# Patient Record
Sex: MALE | Race: WHITE | NOT HISPANIC OR LATINO | Employment: OTHER | URBAN - METROPOLITAN AREA
[De-identification: names, ages, dates, MRNs, and addresses within clinical notes are randomized per-mention and may not be internally consistent; named-entity substitution may affect disease eponyms.]

---

## 2022-01-26 ENCOUNTER — OFFICE VISIT (OUTPATIENT)
Dept: INTERNAL MEDICINE | Facility: CLINIC | Age: 67
End: 2022-01-26

## 2022-01-26 VITALS
DIASTOLIC BLOOD PRESSURE: 82 MMHG | BODY MASS INDEX: 28.29 KG/M2 | HEART RATE: 111 BPM | WEIGHT: 197.6 LBS | TEMPERATURE: 98.2 F | HEIGHT: 70 IN | OXYGEN SATURATION: 98 % | SYSTOLIC BLOOD PRESSURE: 136 MMHG

## 2022-01-26 DIAGNOSIS — E78.2 MIXED HYPERLIPIDEMIA: Chronic | ICD-10-CM

## 2022-01-26 DIAGNOSIS — E66.3 OVERWEIGHT (BMI 25.0-29.9): ICD-10-CM

## 2022-01-26 DIAGNOSIS — J45.30 MILD PERSISTENT ASTHMA WITHOUT COMPLICATION: Chronic | ICD-10-CM

## 2022-01-26 DIAGNOSIS — R73.09 ELEVATED GLUCOSE: ICD-10-CM

## 2022-01-26 DIAGNOSIS — Z12.5 SCREENING PSA (PROSTATE SPECIFIC ANTIGEN): ICD-10-CM

## 2022-01-26 DIAGNOSIS — C44.219 BCC (BASAL CELL CARCINOMA), EAR, LEFT: ICD-10-CM

## 2022-01-26 DIAGNOSIS — Z11.59 NEED FOR HEPATITIS C SCREENING TEST: ICD-10-CM

## 2022-01-26 DIAGNOSIS — I10 ESSENTIAL HYPERTENSION: Primary | Chronic | ICD-10-CM

## 2022-01-26 PROBLEM — J30.9 ALLERGIC RHINITIS: Status: ACTIVE | Noted: 2017-02-02

## 2022-01-26 PROBLEM — E78.5 HYPERLIPIDEMIA: Status: ACTIVE | Noted: 2017-02-02

## 2022-01-26 PROCEDURE — 99204 OFFICE O/P NEW MOD 45 MIN: CPT | Performed by: STUDENT IN AN ORGANIZED HEALTH CARE EDUCATION/TRAINING PROGRAM

## 2022-01-26 RX ORDER — SIMVASTATIN 20 MG
TABLET ORAL DAILY
COMMUNITY
Start: 2022-01-03 | End: 2022-03-23

## 2022-01-26 RX ORDER — TIZANIDINE 4 MG/1
TABLET ORAL
COMMUNITY
End: 2022-04-14 | Stop reason: SDUPTHER

## 2022-01-26 RX ORDER — MONTELUKAST SODIUM 10 MG/1
TABLET ORAL
COMMUNITY
End: 2022-04-14 | Stop reason: SDUPTHER

## 2022-01-26 RX ORDER — ALBUTEROL SULFATE 90 UG/1
AEROSOL, METERED RESPIRATORY (INHALATION)
COMMUNITY
End: 2022-04-14 | Stop reason: SDUPTHER

## 2022-01-26 RX ORDER — IBUPROFEN 200 MG
TABLET ORAL AS NEEDED
COMMUNITY

## 2022-01-26 RX ORDER — GUAIFENESIN 600 MG/1
TABLET, EXTENDED RELEASE ORAL AS NEEDED
COMMUNITY

## 2022-01-26 RX ORDER — PSEUDOEPHEDRINE HCL 30 MG
30 TABLET ORAL
COMMUNITY
End: 2022-04-14 | Stop reason: SDUPTHER

## 2022-01-26 RX ORDER — MULTIPLE VITAMINS W/ MINERALS TAB 9MG-400MCG
1 TAB ORAL DAILY
COMMUNITY

## 2022-01-26 RX ORDER — FLUTICASONE PROPIONATE 50 MCG
SPRAY, SUSPENSION (ML) NASAL
COMMUNITY
End: 2022-04-14 | Stop reason: SDUPTHER

## 2022-01-26 RX ORDER — FEXOFENADINE HCL 180 MG/1
180 TABLET ORAL DAILY
COMMUNITY

## 2022-01-26 RX ORDER — FAMOTIDINE 20 MG/1
20 TABLET, FILM COATED ORAL DAILY
COMMUNITY

## 2022-01-26 RX ORDER — BUDESONIDE AND FORMOTEROL FUMARATE DIHYDRATE 80; 4.5 UG/1; UG/1
2 AEROSOL RESPIRATORY (INHALATION) 2 TIMES DAILY
COMMUNITY
Start: 2022-01-03 | End: 2022-04-14 | Stop reason: SDUPTHER

## 2022-01-26 RX ORDER — LISINOPRIL 10 MG/1
TABLET ORAL DAILY
COMMUNITY
Start: 2022-01-03 | End: 2022-04-14 | Stop reason: SDUPTHER

## 2022-01-26 NOTE — PROGRESS NOTES
"Chief Complaint  Gabino Gannon is a 66 y.o. male presenting for Annual Exam (establish care).     From [place], [place]. Moved to [place] 1991. 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 Has worked with [employer] from 1991 until retiring 2011. . Has son and daughter who live in [place].     Patient has a past medical history of hypertension, hyperlipidemia, mild persistent asthma, seasonal allergies and basal cell carcinoma left ear (annual f/u derm Dr. Hernandez)    History of Present Illness  Patient is here to establish care after his previous PCP retired.    Patient started on Saturday, 1/15/2022 with runny nose, also congestion.  Total of 4 family members were sick at the same time, they all tested negative for Covid.  Patient had negative Covid test on 1922.  He is still having mild nasal congestion, has been taking Sudafed, feels like he is recovering.  He is COVID-19 vaccinated and boosted.    Patient also has a history of L5-S1 disc herniation without sciatica about 10-15 years ago.  He did an MRI and also had PT, symptoms resolved.    Patient also has had surgery next to for left ear BCC.  He continues to follow-up with Dr. Hernandez.  His next annual visit is in March.    Patient also has a history of hypertension.  Typically is home bps are around 120s/80s.  He continues to take lisinopril 10 mg.    The following portions of the patient's history were reviewed and updated as appropriate: allergies, current medications, past family history, past medical history, past social history, past surgical history and problem list.    Objective  /82 (BP Location: Left arm, Patient Position: Sitting, Cuff Size: Adult)   Pulse 111   Temp 98.2 °F (36.8 °C) (Temporal)   Ht 177.8 cm (70\")   Wt 89.6 kg (197 lb 9.6 oz)   SpO2 98%   BMI 28.35 kg/m²     Physical Exam  Vitals reviewed.   Constitutional:       Appearance: Normal appearance.   HENT:      Head: Normocephalic and atraumatic.      " Nose: No congestion.   Eyes:      Extraocular Movements: Extraocular movements intact.      Conjunctiva/sclera: Conjunctivae normal.   Cardiovascular:      Rate and Rhythm: Normal rate and regular rhythm.      Heart sounds: Normal heart sounds. No murmur heard.      Pulmonary:      Effort: Pulmonary effort is normal.      Breath sounds: Normal breath sounds.   Abdominal:      General: There is no distension.      Palpations: Abdomen is soft. There is no mass.      Tenderness: There is no abdominal tenderness.   Musculoskeletal:      Cervical back: Neck supple.      Right lower leg: No edema.      Left lower leg: No edema.   Skin:     General: Skin is warm and dry.   Neurological:      Mental Status: He is alert and oriented to person, place, and time. Mental status is at baseline.   Psychiatric:         Behavior: Behavior normal.         Thought Content: Thought content normal.         Assessment/Plan   1. Essential hypertension  BP Readings from Last 3 Encounters:   01/26/22 136/82   Initial blood pressure elevated.  Repeat below 140/90.  We will continue on current dose of lisinopril 10 mg.  Patient has also been using Sudafed and he is aware that it can elevate BP.  - Comprehensive Metabolic Panel; Future    2. Mixed hyperlipidemia  Patient will return for fasting lipids before annual Medicare visit.  - Lipid Panel; Future    3. BCC (basal cell carcinoma), ear, left  Patient will continue follow-up with dermatology, next in March.    4. Mild persistent asthma without complication  Doing well.  Continue on current medications.  Normal exam today.    5. Elevated glucose  Patient states he has had elevated blood sugar in the past.  Will check A1c before next visit.  - Hemoglobin A1c; Future    6. Need for hepatitis C screening test  We will screen as recommended.  - Hepatitis C Antibody; Future    7. Screening PSA (prostate specific antigen)  Patient has done PSA screening in the past, has been normal.  He would  like to continue.  We discussed false positives and potential for work-up with elevated PSA.  Patient is aware.  - PSA Screen; Future    8. Overweight (BMI 25.0-29.9)  Patient's Body mass index is 28.35 kg/m². indicating that he is overweight (BMI 25-29.9). Obesity-related health conditions include the following: hypertension and dyslipidemias. Obesity is newly identified. BMI is is above average; BMI management plan is completed. We discussed portion control and increasing exercise..    Return in about 7 weeks (around 3/16/2022) for Medicare Wellness.    Future Appointments       Provider Department Center    3/23/2022 8:30 AM Juan Carlos Paredes MD [facility] INTERNAL MEDICINE TIA          Juan Carlos Paredes MD  Family Medicine  01/26/2022

## 2022-03-16 ENCOUNTER — LAB (OUTPATIENT)
Dept: LAB | Facility: HOSPITAL | Age: 67
End: 2022-03-16

## 2022-03-16 DIAGNOSIS — Z12.5 SCREENING PSA (PROSTATE SPECIFIC ANTIGEN): ICD-10-CM

## 2022-03-16 DIAGNOSIS — E78.2 MIXED HYPERLIPIDEMIA: Chronic | ICD-10-CM

## 2022-03-16 DIAGNOSIS — R73.09 ELEVATED GLUCOSE: ICD-10-CM

## 2022-03-16 DIAGNOSIS — Z11.59 NEED FOR HEPATITIS C SCREENING TEST: ICD-10-CM

## 2022-03-16 DIAGNOSIS — I10 ESSENTIAL HYPERTENSION: Chronic | ICD-10-CM

## 2022-03-16 PROBLEM — R73.03 PREDIABETES: Status: ACTIVE | Noted: 2022-03-16

## 2022-03-16 LAB
ALBUMIN SERPL-MCNC: 4.5 G/DL (ref 3.5–5.2)
ALBUMIN/GLOB SERPL: 2.3 G/DL
ALP SERPL-CCNC: 55 U/L (ref 39–117)
ALT SERPL W P-5'-P-CCNC: 22 U/L (ref 1–41)
ANION GAP SERPL CALCULATED.3IONS-SCNC: 11 MMOL/L (ref 5–15)
AST SERPL-CCNC: 24 U/L (ref 1–40)
BILIRUB SERPL-MCNC: 1 MG/DL (ref 0–1.2)
BUN SERPL-MCNC: 9 MG/DL (ref 8–23)
BUN/CREAT SERPL: 8.7 (ref 7–25)
CALCIUM SPEC-SCNC: 10.1 MG/DL (ref 8.6–10.5)
CHLORIDE SERPL-SCNC: 103 MMOL/L (ref 98–107)
CHOLEST SERPL-MCNC: 202 MG/DL (ref 0–200)
CO2 SERPL-SCNC: 28 MMOL/L (ref 22–29)
CREAT SERPL-MCNC: 1.03 MG/DL (ref 0.76–1.27)
EGFRCR SERPLBLD CKD-EPI 2021: 80.6 ML/MIN/1.73
GLOBULIN UR ELPH-MCNC: 2 GM/DL
GLUCOSE SERPL-MCNC: 106 MG/DL (ref 65–99)
HBA1C MFR BLD: 5.7 % (ref 4.8–5.6)
HCV AB SER DONR QL: NORMAL
HDLC SERPL-MCNC: 77 MG/DL (ref 40–60)
LDLC SERPL CALC-MCNC: 101 MG/DL (ref 0–100)
LDLC/HDLC SERPL: 1.26 {RATIO}
POTASSIUM SERPL-SCNC: 4.2 MMOL/L (ref 3.5–5.2)
PROT SERPL-MCNC: 6.5 G/DL (ref 6–8.5)
PSA SERPL-MCNC: 1.05 NG/ML (ref 0–4)
SODIUM SERPL-SCNC: 142 MMOL/L (ref 136–145)
TRIGL SERPL-MCNC: 140 MG/DL (ref 0–150)
VLDLC SERPL-MCNC: 24 MG/DL (ref 5–40)

## 2022-03-16 PROCEDURE — G0103 PSA SCREENING: HCPCS

## 2022-03-16 PROCEDURE — 80061 LIPID PANEL: CPT

## 2022-03-16 PROCEDURE — 80053 COMPREHEN METABOLIC PANEL: CPT

## 2022-03-16 PROCEDURE — 86803 HEPATITIS C AB TEST: CPT

## 2022-03-16 PROCEDURE — 83036 HEMOGLOBIN GLYCOSYLATED A1C: CPT

## 2022-03-23 ENCOUNTER — OFFICE VISIT (OUTPATIENT)
Dept: INTERNAL MEDICINE | Facility: CLINIC | Age: 67
End: 2022-03-23

## 2022-03-23 VITALS
WEIGHT: 190.8 LBS | TEMPERATURE: 98 F | DIASTOLIC BLOOD PRESSURE: 82 MMHG | HEART RATE: 102 BPM | SYSTOLIC BLOOD PRESSURE: 122 MMHG | HEIGHT: 71 IN | OXYGEN SATURATION: 97 % | BODY MASS INDEX: 26.71 KG/M2

## 2022-03-23 DIAGNOSIS — Z87.891 HISTORY OF CIGARETTE SMOKING: ICD-10-CM

## 2022-03-23 DIAGNOSIS — Z23 NEED FOR 23-POLYVALENT PNEUMOCOCCAL POLYSACCHARIDE VACCINE: ICD-10-CM

## 2022-03-23 DIAGNOSIS — Z13.6 ENCOUNTER FOR ABDOMINAL AORTIC ANEURYSM (AAA) SCREENING: ICD-10-CM

## 2022-03-23 DIAGNOSIS — Z00.00 MEDICARE WELCOME VISIT: Primary | ICD-10-CM

## 2022-03-23 DIAGNOSIS — R73.03 PREDIABETES: Chronic | ICD-10-CM

## 2022-03-23 DIAGNOSIS — E66.3 OVERWEIGHT (BMI 25.0-29.9): ICD-10-CM

## 2022-03-23 DIAGNOSIS — E78.2 MIXED HYPERLIPIDEMIA: Chronic | ICD-10-CM

## 2022-03-23 DIAGNOSIS — I10 ESSENTIAL HYPERTENSION: Chronic | ICD-10-CM

## 2022-03-23 PROCEDURE — G0009 ADMIN PNEUMOCOCCAL VACCINE: HCPCS | Performed by: STUDENT IN AN ORGANIZED HEALTH CARE EDUCATION/TRAINING PROGRAM

## 2022-03-23 PROCEDURE — 1159F MED LIST DOCD IN RCRD: CPT | Performed by: STUDENT IN AN ORGANIZED HEALTH CARE EDUCATION/TRAINING PROGRAM

## 2022-03-23 PROCEDURE — G0402 INITIAL PREVENTIVE EXAM: HCPCS | Performed by: STUDENT IN AN ORGANIZED HEALTH CARE EDUCATION/TRAINING PROGRAM

## 2022-03-23 PROCEDURE — 90732 PPSV23 VACC 2 YRS+ SUBQ/IM: CPT | Performed by: STUDENT IN AN ORGANIZED HEALTH CARE EDUCATION/TRAINING PROGRAM

## 2022-03-23 RX ORDER — ACETAMINOPHEN 500 MG
TABLET ORAL AS NEEDED
COMMUNITY

## 2022-03-23 RX ORDER — CHOLECALCIFEROL (VITAMIN D3) 125 MCG
500 CAPSULE ORAL DAILY
COMMUNITY

## 2022-03-23 RX ORDER — ATORVASTATIN CALCIUM 20 MG/1
20 TABLET, FILM COATED ORAL DAILY
Qty: 30 TABLET | Refills: 0 | Status: SHIPPED | OUTPATIENT
Start: 2022-03-23 | End: 2022-04-14 | Stop reason: SDUPTHER

## 2022-03-23 RX ORDER — BIOTIN 1 MG
1000 TABLET ORAL DAILY
COMMUNITY

## 2022-03-23 RX ORDER — CETIRIZINE HYDROCHLORIDE 10 MG/1
10 TABLET ORAL DAILY
COMMUNITY

## 2022-03-23 NOTE — PROGRESS NOTES
QUICK REFERENCE INFORMATION:  The ABCs of the Annual Wellness Visit    Welcome to Medicare Visit    From [place], [place]. Moved to [place] . 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 Has worked with [employer] from  until retiring . . Has son and daughter who live in [place].      Patient has a past medical history of hypertension, hyperlipidemia, mild persistent asthma, seasonal allergies and basal cell carcinoma left ear (annual f/u derm Dr. Hernandez).      Patient is here for welcome to Medicare visit.  He is overall doing well, has no significant complaints.  He does have occasional lower back pain, typically after he has lifted something or turn his neck in certain way, but it passes within 1-2 days.  He uses Tylenol, ibuprofen, sometimes tizanidine.    Patient has been on aspirin for a long time, he does not have a history of cardiovascular disease or any other blood vessel disease.  He sometimes has noticed abdominal discomfort after taking it.    Patient has also been on Zocor/simvastatin for many years and has tolerated well.    HEALTH RISK ASSESSMENT    1955    Recent Hospitalizations:  No hospitalization(s) within the last year..      Current Medical Providers:  Patient Care Team:  Juan Carlos Paredes MD as PCP - General (Family Medicine)  René Hernandez (Dermatology)  Gabino Russell MD as Consulting Physician (Colon and Rectal Surgery)  Randy Lees DPM as Consulting Physician (Podiatry)  Yolis Finn OD (Optometry)      Smoking Status:  Social History     Tobacco Use   Smoking Status Former Smoker   • Packs/day: 2.00   • Years: 15.00   • Pack years: 30.00   • Types: Cigarettes   • Quit date:    • Years since quittin.2   Smokeless Tobacco Never Used       Alcohol Consumption:  Social History     Substance and Sexual Activity   Alcohol Use Yes    Comment: 10 weekly       Depression Screen:   PHQ-2/PHQ-9 Depression Screening 3/23/2022    Retired PHQ-9 Total Score -   Retired Total Score -   Little Interest or Pleasure in Doing Things 0-->not at all   Feeling Down, Depressed or Hopeless 0-->not at all   PHQ-9: Brief Depression Severity Measure Score 0       Health Habits and Functional and Cognitive Screening:  Functional & Cognitive Status 3/23/2022   Do you have difficulty preparing food and eating? No   Do you have difficulty bathing yourself, getting dressed or grooming yourself? No   Do you have difficulty using the toilet? No   Do you have difficulty moving around from place to place? No   Do you have trouble with steps or getting out of a bed or a chair? No   Current Diet Well Balanced Diet   Dental Exam Up to date   Eye Exam Up to date   Exercise (times per week) 4 times per week   Current Exercises Include Cardiovascular Workout   Do you need help using the phone?  No   Are you deaf or do you have serious difficulty hearing?  No   Do you need help with transportation? No   Do you need help shopping? No   Do you need help preparing meals?  No   Do you need help with housework?  No   Do you need help with laundry? No   Do you need help taking your medications? No   Do you need help managing money? No   Do you ever drive or ride in a car without wearing a seat belt? No   Have you felt unusual stress, anger or loneliness in the last month? No   Who do you live with? Spouse   If you need help, do you have trouble finding someone available to you? No   Have you been bothered in the last four weeks by sexual problems? No   Do you have difficulty concentrating, remembering or making decisions? No       Fall Risk Screen:  STEADI Fall Risk Assessment was completed, and patient is at LOW risk for falls.Assessment completed on:3/23/2022    ACE III MINI   ATTENTION  What is the year: correct  What is the month of the year: correct  What is the day of the week?: correct  What is the date?: correct  MEMORY  Repeat address three times, only score third  attempt: [address] [place], [place]: 7  HOW MANY ANIMALS DID THE PATIENT NAME  Verbal Fluency -- Animal Names (0-25): 22+  CLOCK DRAWING  Clock Drawing: Clock Hands  MEMORY RECALL  Tell me what you remember about that name and address we were repeating at the beginnin  ACE TOTAL SCORE  Total ACE Score - <25/30 strongly suggests cognitive impairment; <21/30 almost certainly shows dementia: 27    Does the patient have evidence of cognitive impairment? No    Aspirin use counseling? Does not need ASA but is currently taking (advised patient that ASA is not indicated and patient chooses to stop it)      Recent Lab Results:  CMP:  Lab Results   Component Value Date    BUN 9 2022    CREATININE 1.03 2022    BCR 8.7 2022     2022    K 4.2 2022    CO2 28.0 2022    CALCIUM 10.1 2022    ALBUMIN 4.50 2022    BILITOT 1.0 2022    ALKPHOS 55 2022    AST 24 2022    ALT 22 2022     HbA1c:  Lab Results   Component Value Date    HGBA1C 5.70 (H) 2022     Microalbumin:  No results found for: MICROALBUR, POCMALB, POCCREAT  Lipid Panel  Lab Results   Component Value Date    CHOL 202 (H) 2022    TRIG 140 2022    HDL 77 (H) 2022     (H) 2022    AST 24 2022    ALT 22 2022       Visual Acuity:   Visual Acuity Screening    Right eye Left eye Both eyes   Without correction:      With correction: 20/25 20/25 20/25       Age-appropriate Screening Schedule:  Refer to the list below for future screening recommendations based on patient's age, sex and/or medical conditions. Orders for these recommended tests are listed in the plan section. The patient has been provided with a written plan.    Health Maintenance   Topic Date Due   • LIPID PANEL  2023   • TDAP/TD VACCINES (2 - Td or Tdap) 2032   • INFLUENZA VACCINE  Completed   • ZOSTER VACCINE  Completed        Subjective   History of  Present Illness    Gabino Gannon is a 66 y.o. male an established patient presenting for a Welcome to Medicare Visit.     CHRONIC CONDITIONS    The following portions of the patient's history were reviewed and updated as appropriate: allergies, current medications, past family history, past medical history, past social history, past surgical history and problem list.    Outpatient Medications Prior to Visit   Medication Sig Dispense Refill   • acetaminophen (TYLENOL) 500 MG tablet Take  by mouth As Needed for Mild Pain .     • albuterol sulfate  (90 Base) MCG/ACT inhaler Ventolin HFA 90 mcg/actuation aerosol inhaler   Use 2 puffs Every four to six hours prn     • Apoaequorin (PREVAGEN PO) Take  by mouth Daily.     • ascorbic acid (VITAMIN C) 1000 MG tablet Vitamin C 1,000 mg tablet   Daily     • Biotin 1000 MCG tablet Take 1,000 mcg by mouth Daily.     • Boswellia-Glucosamine-Vit D (OSTEO BI-FLEX ONE PER DAY PO) Osteo Bi-Flex   Daily     • budesonide-formoterol (SYMBICORT) 80-4.5 MCG/ACT inhaler 2 puffs 2 (Two) Times a Day.     • Calcium Carbonate Antacid (ANTACID PO) Take  by mouth As Needed.     • cetirizine (zyrTEC) 10 MG tablet Take 10 mg by mouth Daily. Alternates with allegra     • Cholecalciferol 25 MCG (1000 UT) capsule Vitamin D3 25 mcg (1,000 unit) capsule   Take 1 capsule every day by oral route.     • famotidine (PEPCID) 20 MG tablet Take 20 mg by mouth Daily.     • fexofenadine (ALLEGRA) 180 MG tablet Take 180 mg by mouth Daily.     • fluticasone (FLONASE) 50 MCG/ACT nasal spray fluticasone propionate 50 mcg/actuation nasal spray,suspension   Spray 2 sprays every day by intranasal route as directed.     • Folic Acid (FOLATE PO) Take  by mouth Daily.     • guaiFENesin (MUCINEX) 600 MG 12 hr tablet As Needed.     • ibuprofen (ADVIL,MOTRIN) 200 MG tablet As Needed.     • Krill Oil 300 MG capsule Daily.     • lisinopril (PRINIVIL,ZESTRIL) 10 MG tablet Daily.     • montelukast (SINGULAIR) 10 MG tablet  "montelukast 10 mg tablet   Take 1 tablet every day by oral route.     • multivitamin with minerals tablet tablet Take 1 tablet by mouth Daily.     • Probiotic Product (PROBIOTIC DAILY PO) Daily.     • pseudoephedrine (SUDAFED) 30 MG tablet 30 mg. 1 tablet every 4-6 hours as needed     • vitamin B-12 (CYANOCOBALAMIN) 500 MCG tablet Take 500 mcg by mouth Daily.     • ASPIRIN PO Takes 2  325 mg as needed     • simvastatin (ZOCOR) 20 MG tablet Daily.     • tiZANidine (ZANAFLEX) 4 MG tablet tizanidine 4 mg tablet   Take one tablet daily as needed     • Nutritional Supplements (NUTRITIONAL SUPPLEMENT PO) Take  by mouth. Testosterone Booster  2 caplets daily       No facility-administered medications prior to visit.       Patient Active Problem List   Diagnosis   • Allergic rhinitis   • Essential hypertension   • Mixed hyperlipidemia   • Mild persistent asthma without complication   • BCC (basal cell carcinoma), ear, left   • Overweight (BMI 25.0-29.9)   • Prediabetes       Advance Care Planning:  ACP discussion was held with the patient during this visit. Patient has an advance directive (not in EMR), copy requested.    Identification of Risk Factors:  Risk factors include: Abdominal Aortic Aneurysm Screening  Advance Directive Discussion  Immunizations Discussed/Encouraged (specific immunizations; Pneumococcal 23 ).    Review of Systems    Compared to one year ago, the patient feels his physical health is the same.  Compared to one year ago, the patient feels his mental health is the same.    Objective    Physical Exam     Vitals:    03/23/22 0830   BP: 122/82   BP Location: Left arm   Patient Position: Sitting   Cuff Size: Adult   Pulse: 102   Temp: 98 °F (36.7 °C)   TempSrc: Temporal   SpO2: 97%   Weight: 86.5 kg (190 lb 12.8 oz)   Height: 179.1 cm (70.5\")   PainSc: 0-No pain       Patient's Body mass index is 26.99 kg/m². indicating that he is overweight (BMI 25-29.9). Patient's (Body mass index is 26.99 kg/m².) " indicates that they are overweight with health conditions that include hypertension and dyslipidemias . Weight is improving with lifestyle modifications. BMI is is above average; BMI management plan is completed. We discussed portion control and increasing exercise. .      Procedure   Procedures       Assessment/Plan     1. Medicare welcome visit  New request to Dr. Beal send for copy of colonoscopy done in 2017.  Patient states it was normal.    2. Essential hypertension  BP Readings from Last 3 Encounters:   03/23/22 122/82   01/26/22 136/82   Stable.  Continue on lisinopril 10 mg.    3. Mixed hyperlipidemia  The 10-year ASCVD risk score (Kerrickrowan AMAYA JrLan, et al., 2013) is: 10.7%    Values used to calculate the score:      Age: 66 years      Sex: Male      Is Non- : No      Diabetic: No      Tobacco smoker: No      Systolic Blood Pressure: 122 mmHg      Is BP treated: Yes      HDL Cholesterol: 77 mg/dL      Total Cholesterol: 202 mg/dL  Patient has been on simvastatin 20 mg for a long time.  I recommend increasing the dose versus transitioning to atorvastatin 20 mg.  Patient would like to do trial of atorvastatin.  Counseled on side effects including myalgias.  If any side effects he will let me know, for now I will fill for 30 days and if he tolerates well he will get in touch for 90-day supply with refills to his mail order pharmacy.  He can return for repeat blood test in about 2 months fasting.  - atorvastatin (LIPITOR) 20 MG tablet; Take 1 tablet by mouth Daily. Stop Zocor  Dispense: 30 tablet; Refill: 0  - Lipid Panel; Future    4. Prediabetes  Hemoglobin A1C   Date Value Ref Range Status   03/16/2022 5.70 (H) 4.80 - 5.60 % Final   We will continue to monitor annually.  Patient states it has been borderline for many years.    5. History of cigarette smoking  6. Encounter for abdominal aortic aneurysm (AAA) screening  Patient did smoke for a few years in his younger days.  - [place] aaa screen  limited; Future    7. Overweight (BMI 25.0-29.9)    8. Need for 23-polyvalent pneumococcal polysaccharide vaccine  Administered today.  Would be due for Prevnar in 1 year.  - Pneumococcal Polysaccharide Vaccine 23-Valent Greater Than or Equal To 3yo Subcutaneous / IM      Patient Self-Management and Personalized Health Advice  The patient has been provided with information about: diet, exercise, weight management and prevention of cardiac or vascular disease and preventive services including:   · Annual Wellness Visit (AWV)  · Pneumococcal Vaccine and Administration  · Ultrasound Screening for Abdominal Aortic Aneurysm (AAA).    Outpatient Encounter Medications as of 3/23/2022   Medication Sig Dispense Refill   • acetaminophen (TYLENOL) 500 MG tablet Take  by mouth As Needed for Mild Pain .     • albuterol sulfate  (90 Base) MCG/ACT inhaler Ventolin HFA 90 mcg/actuation aerosol inhaler   Use 2 puffs Every four to six hours prn     • Apoaequorin (PREVAGEN PO) Take  by mouth Daily.     • ascorbic acid (VITAMIN C) 1000 MG tablet Vitamin C 1,000 mg tablet   Daily     • Biotin 1000 MCG tablet Take 1,000 mcg by mouth Daily.     • Boswellia-Glucosamine-Vit D (OSTEO BI-FLEX ONE PER DAY PO) Osteo Bi-Flex   Daily     • budesonide-formoterol (SYMBICORT) 80-4.5 MCG/ACT inhaler 2 puffs 2 (Two) Times a Day.     • Calcium Carbonate Antacid (ANTACID PO) Take  by mouth As Needed.     • cetirizine (zyrTEC) 10 MG tablet Take 10 mg by mouth Daily. Alternates with allegra     • Cholecalciferol 25 MCG (1000 UT) capsule Vitamin D3 25 mcg (1,000 unit) capsule   Take 1 capsule every day by oral route.     • famotidine (PEPCID) 20 MG tablet Take 20 mg by mouth Daily.     • fexofenadine (ALLEGRA) 180 MG tablet Take 180 mg by mouth Daily.     • fluticasone (FLONASE) 50 MCG/ACT nasal spray fluticasone propionate 50 mcg/actuation nasal spray,suspension   Spray 2 sprays every day by intranasal route as directed.     • Folic Acid (FOLATE  PO) Take  by mouth Daily.     • guaiFENesin (MUCINEX) 600 MG 12 hr tablet As Needed.     • ibuprofen (ADVIL,MOTRIN) 200 MG tablet As Needed.     • Krill Oil 300 MG capsule Daily.     • lisinopril (PRINIVIL,ZESTRIL) 10 MG tablet Daily.     • montelukast (SINGULAIR) 10 MG tablet montelukast 10 mg tablet   Take 1 tablet every day by oral route.     • multivitamin with minerals tablet tablet Take 1 tablet by mouth Daily.     • Probiotic Product (PROBIOTIC DAILY PO) Daily.     • pseudoephedrine (SUDAFED) 30 MG tablet 30 mg. 1 tablet every 4-6 hours as needed     • vitamin B-12 (CYANOCOBALAMIN) 500 MCG tablet Take 500 mcg by mouth Daily.     • [DISCONTINUED] ASPIRIN PO Takes 2  325 mg as needed     • [DISCONTINUED] simvastatin (ZOCOR) 20 MG tablet Daily.     • atorvastatin (LIPITOR) 20 MG tablet Take 1 tablet by mouth Daily. Stop Zocor 30 tablet 0   • tiZANidine (ZANAFLEX) 4 MG tablet tizanidine 4 mg tablet   Take one tablet daily as needed     • [DISCONTINUED] Nutritional Supplements (NUTRITIONAL SUPPLEMENT PO) Take  by mouth. Testosterone Booster  2 caplets daily       No facility-administered encounter medications on file as of 3/23/2022.       Reviewed use of high risk medication in the elderly: yes  Reviewed for potential of harmful drug interactions in the elderly: yes    Follow Up:  Return in about 1 year (around 3/23/2023) for Medicare Wellness.   Future Appointments       Provider Department Center    3/27/2023 8:30 AM Juan Carlos Paredes MD [facility] INTERNAL MEDICINE TIA            There are no Patient Instructions on file for this visit.    An After Visit Summary and PPPS with all of these plans were given to the patient.      Juan Carlos Paredes MD

## 2022-04-13 DIAGNOSIS — E78.2 MIXED HYPERLIPIDEMIA: Chronic | ICD-10-CM

## 2022-04-13 NOTE — TELEPHONE ENCOUNTER
Regarding: FW: Prescription refills      ----- Message -----  From: Dinora Melendez LPN  Sent: 2022   9:04 AM EDT  To: Juan Carlos Paredes MD  Subject: Prescription refills                             ----- Message from Dinora Melendez LPN sent at 2022  9:04 AM EDT -----       ----- Message from Gabino Gannon to Juan Carlos Paredes MD sent at 2022  8:57 AM -----   I have been tolerating the Atorvastatin well for the last 3 weeks, no side effects noted. Please send in a Rx for this to [email or web address] for a 90 day supply with refills. I plan on doing the blood work next month to check on my cholesterol levels.    My Podiatrist has put me on Terbinafine HCL Tab 250mg for toenail fungus. I take it for 7 days at the start of each month, will probably be on it for one year.    All of my past prescriptions have , I am in need of refills. All but the Sudogest go through [email or web address], the Sudogest is local with [facility].    Thank you.

## 2022-04-14 RX ORDER — ALBUTEROL SULFATE 90 UG/1
1 AEROSOL, METERED RESPIRATORY (INHALATION) EVERY 4 HOURS PRN
Qty: 18 G | Refills: 1 | Status: SHIPPED | OUTPATIENT
Start: 2022-04-14 | End: 2022-05-05 | Stop reason: SDUPTHER

## 2022-04-14 RX ORDER — PSEUDOEPHEDRINE HCL 30 MG
30 TABLET ORAL EVERY 4 HOURS PRN
Qty: 20 TABLET | Refills: 0 | Status: SHIPPED | OUTPATIENT
Start: 2022-04-14 | End: 2022-04-18 | Stop reason: SDUPTHER

## 2022-04-14 RX ORDER — BUDESONIDE AND FORMOTEROL FUMARATE DIHYDRATE 80; 4.5 UG/1; UG/1
2 AEROSOL RESPIRATORY (INHALATION) 2 TIMES DAILY
Qty: 10.2 G | Refills: 5 | Status: SHIPPED | OUTPATIENT
Start: 2022-04-14 | End: 2022-05-05 | Stop reason: SDUPTHER

## 2022-04-14 RX ORDER — ATORVASTATIN CALCIUM 20 MG/1
20 TABLET, FILM COATED ORAL DAILY
Qty: 90 TABLET | Refills: 1 | Status: SHIPPED | OUTPATIENT
Start: 2022-04-14 | End: 2022-09-06 | Stop reason: SDUPTHER

## 2022-04-14 RX ORDER — FLUTICASONE PROPIONATE 50 MCG
SPRAY, SUSPENSION (ML) NASAL
Qty: 16 G | Refills: 1 | Status: SHIPPED | OUTPATIENT
Start: 2022-04-14 | End: 2022-05-05 | Stop reason: SDUPTHER

## 2022-04-14 RX ORDER — TIZANIDINE 4 MG/1
4 TABLET ORAL DAILY
Qty: 90 TABLET | Refills: 1 | Status: SHIPPED | OUTPATIENT
Start: 2022-04-14 | End: 2022-09-06 | Stop reason: SDUPTHER

## 2022-04-14 RX ORDER — LISINOPRIL 10 MG/1
10 TABLET ORAL DAILY
Qty: 90 TABLET | Refills: 1 | Status: SHIPPED | OUTPATIENT
Start: 2022-04-14 | End: 2022-04-18

## 2022-04-14 RX ORDER — MONTELUKAST SODIUM 10 MG/1
10 TABLET ORAL NIGHTLY
Qty: 90 TABLET | Refills: 1 | Status: SHIPPED | OUTPATIENT
Start: 2022-04-14 | End: 2022-09-06 | Stop reason: SDUPTHER

## 2022-04-18 ENCOUNTER — PATIENT MESSAGE (OUTPATIENT)
Dept: INTERNAL MEDICINE | Facility: CLINIC | Age: 67
End: 2022-04-18

## 2022-04-18 RX ORDER — LISINOPRIL 5 MG/1
10 TABLET ORAL DAILY
Qty: 90 TABLET | Refills: 1 | Status: SHIPPED | COMMUNITY
Start: 2022-04-18 | End: 2022-09-06 | Stop reason: SDUPTHER

## 2022-04-18 RX ORDER — PSEUDOEPHEDRINE HCL 30 MG
30 TABLET ORAL EVERY 4 HOURS PRN
Qty: 20 TABLET | Refills: 0 | Status: SHIPPED | OUTPATIENT
Start: 2022-04-18

## 2022-05-04 ENCOUNTER — LAB (OUTPATIENT)
Dept: LAB | Facility: HOSPITAL | Age: 67
End: 2022-05-04

## 2022-05-04 DIAGNOSIS — E78.2 MIXED HYPERLIPIDEMIA: Chronic | ICD-10-CM

## 2022-05-04 LAB
CHOLEST SERPL-MCNC: 183 MG/DL (ref 0–200)
HDLC SERPL-MCNC: 85 MG/DL (ref 40–60)
LDLC SERPL CALC-MCNC: 82 MG/DL (ref 0–100)
LDLC/HDLC SERPL: 0.94 {RATIO}
TRIGL SERPL-MCNC: 89 MG/DL (ref 0–150)
VLDLC SERPL-MCNC: 16 MG/DL (ref 5–40)

## 2022-05-04 PROCEDURE — 80061 LIPID PANEL: CPT

## 2022-05-31 ENCOUNTER — HOSPITAL ENCOUNTER (OUTPATIENT)
Dept: ULTRASOUND IMAGING | Facility: HOSPITAL | Age: 67
End: 2022-05-31

## 2022-06-01 ENCOUNTER — HOSPITAL ENCOUNTER (OUTPATIENT)
Dept: ULTRASOUND IMAGING | Facility: HOSPITAL | Age: 67
Discharge: HOME | End: 2022-06-01
Admitting: STUDENT IN AN ORGANIZED HEALTH CARE EDUCATION/TRAINING PROGRAM

## 2022-06-01 DIAGNOSIS — Z87.891 HISTORY OF CIGARETTE SMOKING: ICD-10-CM

## 2022-06-01 DIAGNOSIS — Z13.6 ENCOUNTER FOR ABDOMINAL AORTIC ANEURYSM (AAA) SCREENING: ICD-10-CM

## 2022-06-01 PROCEDURE — 76706 US ABDL AORTA SCREEN AAA: CPT

## 2022-09-06 DIAGNOSIS — E78.2 MIXED HYPERLIPIDEMIA: Chronic | ICD-10-CM

## 2022-09-06 RX ORDER — TIZANIDINE 4 MG/1
4 TABLET ORAL DAILY
Qty: 90 TABLET | Refills: 1 | Status: SHIPPED | OUTPATIENT
Start: 2022-09-06 | End: 2023-03-27 | Stop reason: SDUPTHER

## 2022-09-06 RX ORDER — ALBUTEROL SULFATE 90 UG/1
1 AEROSOL, METERED RESPIRATORY (INHALATION) EVERY 4 HOURS PRN
Qty: 54 G | Refills: 1 | Status: SHIPPED | OUTPATIENT
Start: 2022-09-06 | End: 2023-03-27 | Stop reason: SDUPTHER

## 2022-09-06 RX ORDER — LISINOPRIL 5 MG/1
10 TABLET ORAL DAILY
Qty: 90 TABLET | Refills: 1 | Status: SHIPPED | OUTPATIENT
Start: 2022-09-06 | End: 2023-03-27 | Stop reason: DRUGHIGH

## 2022-09-06 RX ORDER — ATORVASTATIN CALCIUM 20 MG/1
20 TABLET, FILM COATED ORAL DAILY
Qty: 90 TABLET | Refills: 1 | Status: SHIPPED | OUTPATIENT
Start: 2022-09-06 | End: 2023-03-27 | Stop reason: SDUPTHER

## 2022-09-06 RX ORDER — FLUTICASONE PROPIONATE 50 MCG
SPRAY, SUSPENSION (ML) NASAL
Qty: 48 G | Refills: 1 | Status: SHIPPED | OUTPATIENT
Start: 2022-09-06 | End: 2023-03-27 | Stop reason: SDUPTHER

## 2022-09-06 RX ORDER — BUDESONIDE AND FORMOTEROL FUMARATE DIHYDRATE 80; 4.5 UG/1; UG/1
2 AEROSOL RESPIRATORY (INHALATION) 2 TIMES DAILY
Qty: 30.6 G | Refills: 1 | Status: SHIPPED | OUTPATIENT
Start: 2022-09-06 | End: 2023-03-27 | Stop reason: SDUPTHER

## 2022-09-06 RX ORDER — MONTELUKAST SODIUM 10 MG/1
10 TABLET ORAL NIGHTLY
Qty: 90 TABLET | Refills: 1 | Status: SHIPPED | OUTPATIENT
Start: 2022-09-06

## 2022-09-06 NOTE — TELEPHONE ENCOUNTER
Rx Refill Note  Requested Prescriptions     Pending Prescriptions Disp Refills   • atorvastatin (LIPITOR) 20 MG tablet 90 tablet 1     Sig: Take 1 tablet by mouth Daily. Stop Zocor   • montelukast (SINGULAIR) 10 MG tablet 90 tablet 1     Sig: Take 1 tablet by mouth Every Night.   • tiZANidine (ZANAFLEX) 4 MG tablet 90 tablet 1     Sig: Take 1 tablet by mouth Daily.   • lisinopril (PRINIVIL,ZESTRIL) 5 MG tablet 90 tablet 1     Sig: Take 2 tablets by mouth Daily.   • albuterol sulfate  (90 Base) MCG/ACT inhaler 54 g 1     Sig: Inhale 1 puff Every 4 (Four) Hours As Needed for Wheezing.   • budesonide-formoterol (SYMBICORT) 80-4.5 MCG/ACT inhaler 30.6 g 1     Sig: Inhale 2 puffs 2 (Two) Times a Day.   • fluticasone (FLONASE) 50 MCG/ACT nasal spray 48 g 1     Sig: Spray 2 sprays every day by intranasal route as directed      Last office visit with prescribing clinician: 3/23/2022      Next office visit with prescribing clinician: 3/27/2023            Marlin Greenberg LPN  09/06/22, 11:40 EDT

## 2022-10-17 ENCOUNTER — PATIENT MESSAGE (OUTPATIENT)
Dept: INTERNAL MEDICINE | Facility: CLINIC | Age: 67
End: 2022-10-17

## 2022-10-17 NOTE — TELEPHONE ENCOUNTER
From: Gabino Gannon  To: Juan Carlos Paredes MD  Sent: 10/17/2022 11:00 AM EDT  Subject: Flu shot    Hi Dr Paredes,  I went and got my flu shot this morning: FLUZONE HD 65+ PF 2022-23 INJ 0.7ML  Please update my health record.    Thanks!

## 2023-03-27 ENCOUNTER — LAB (OUTPATIENT)
Dept: LAB | Facility: HOSPITAL | Age: 68
End: 2023-03-27
Payer: MEDICARE

## 2023-03-27 ENCOUNTER — OFFICE VISIT (OUTPATIENT)
Dept: INTERNAL MEDICINE | Facility: CLINIC | Age: 68
End: 2023-03-27
Payer: MEDICARE

## 2023-03-27 VITALS
SYSTOLIC BLOOD PRESSURE: 120 MMHG | BODY MASS INDEX: 26.74 KG/M2 | TEMPERATURE: 98.2 F | HEART RATE: 68 BPM | HEIGHT: 71 IN | WEIGHT: 191 LBS | DIASTOLIC BLOOD PRESSURE: 82 MMHG

## 2023-03-27 DIAGNOSIS — J30.9 ALLERGIC RHINITIS, UNSPECIFIED SEASONALITY, UNSPECIFIED TRIGGER: ICD-10-CM

## 2023-03-27 DIAGNOSIS — Z12.5 SCREENING PSA (PROSTATE SPECIFIC ANTIGEN): ICD-10-CM

## 2023-03-27 DIAGNOSIS — R73.03 PREDIABETES: Chronic | ICD-10-CM

## 2023-03-27 DIAGNOSIS — I10 ESSENTIAL HYPERTENSION: Chronic | ICD-10-CM

## 2023-03-27 DIAGNOSIS — J45.30 MILD PERSISTENT ASTHMA WITHOUT COMPLICATION: Chronic | ICD-10-CM

## 2023-03-27 DIAGNOSIS — E78.2 MIXED HYPERLIPIDEMIA: Chronic | ICD-10-CM

## 2023-03-27 DIAGNOSIS — E66.3 OVERWEIGHT (BMI 25.0-29.9): ICD-10-CM

## 2023-03-27 DIAGNOSIS — Z00.00 INITIAL MEDICARE ANNUAL WELLNESS VISIT: Primary | ICD-10-CM

## 2023-03-27 LAB
ALBUMIN SERPL-MCNC: 4.6 G/DL (ref 3.5–5.2)
ALBUMIN/GLOB SERPL: 2.3 G/DL
ALP SERPL-CCNC: 56 U/L (ref 39–117)
ALT SERPL W P-5'-P-CCNC: 25 U/L (ref 1–41)
ANION GAP SERPL CALCULATED.3IONS-SCNC: 11.5 MMOL/L (ref 5–15)
AST SERPL-CCNC: 30 U/L (ref 1–40)
BILIRUB SERPL-MCNC: 1.2 MG/DL (ref 0–1.2)
BUN SERPL-MCNC: 11 MG/DL (ref 8–23)
BUN/CREAT SERPL: 9.9 (ref 7–25)
CALCIUM SPEC-SCNC: 9.5 MG/DL (ref 8.6–10.5)
CHLORIDE SERPL-SCNC: 103 MMOL/L (ref 98–107)
CHOLEST SERPL-MCNC: 200 MG/DL (ref 0–200)
CO2 SERPL-SCNC: 26.5 MMOL/L (ref 22–29)
CREAT SERPL-MCNC: 1.11 MG/DL (ref 0.76–1.27)
EGFRCR SERPLBLD CKD-EPI 2021: 73.2 ML/MIN/1.73
GLOBULIN UR ELPH-MCNC: 2 GM/DL
GLUCOSE SERPL-MCNC: 105 MG/DL (ref 65–99)
HBA1C MFR BLD: 5.5 % (ref 4.8–5.6)
HDLC SERPL-MCNC: 84 MG/DL (ref 40–60)
LDLC SERPL CALC-MCNC: 94 MG/DL (ref 0–100)
LDLC/HDLC SERPL: 1.08 {RATIO}
POTASSIUM SERPL-SCNC: 4.5 MMOL/L (ref 3.5–5.2)
PROT SERPL-MCNC: 6.6 G/DL (ref 6–8.5)
PSA SERPL-MCNC: 2.06 NG/ML (ref 0–4)
SODIUM SERPL-SCNC: 141 MMOL/L (ref 136–145)
TRIGL SERPL-MCNC: 126 MG/DL (ref 0–150)
VLDLC SERPL-MCNC: 22 MG/DL (ref 5–40)

## 2023-03-27 PROCEDURE — 83036 HEMOGLOBIN GLYCOSYLATED A1C: CPT

## 2023-03-27 PROCEDURE — 80061 LIPID PANEL: CPT

## 2023-03-27 PROCEDURE — G0103 PSA SCREENING: HCPCS

## 2023-03-27 PROCEDURE — 80053 COMPREHEN METABOLIC PANEL: CPT

## 2023-03-27 RX ORDER — BUDESONIDE AND FORMOTEROL FUMARATE DIHYDRATE 80; 4.5 UG/1; UG/1
2 AEROSOL RESPIRATORY (INHALATION) 2 TIMES DAILY
Qty: 30.6 G | Refills: 3 | Status: SHIPPED | OUTPATIENT
Start: 2023-03-27

## 2023-03-27 RX ORDER — ALBUTEROL SULFATE 90 UG/1
1 AEROSOL, METERED RESPIRATORY (INHALATION) EVERY 4 HOURS PRN
Qty: 54 G | Refills: 3 | Status: SHIPPED | OUTPATIENT
Start: 2023-03-27

## 2023-03-27 RX ORDER — LISINOPRIL 10 MG/1
10 TABLET ORAL DAILY
Qty: 90 TABLET | Refills: 3 | Status: SHIPPED | OUTPATIENT
Start: 2023-03-27

## 2023-03-27 RX ORDER — FLUTICASONE PROPIONATE 50 MCG
SPRAY, SUSPENSION (ML) NASAL
Qty: 48 G | Refills: 3 | Status: SHIPPED | OUTPATIENT
Start: 2023-03-27

## 2023-03-27 RX ORDER — LISINOPRIL 10 MG/1
10 TABLET ORAL DAILY
COMMUNITY
End: 2023-03-27 | Stop reason: SDUPTHER

## 2023-03-27 RX ORDER — TIZANIDINE 4 MG/1
4 TABLET ORAL DAILY PRN
Qty: 90 TABLET | Refills: 1 | Status: SHIPPED | OUTPATIENT
Start: 2023-03-27

## 2023-03-27 RX ORDER — ATORVASTATIN CALCIUM 20 MG/1
20 TABLET, FILM COATED ORAL DAILY
Qty: 90 TABLET | Refills: 3 | Status: SHIPPED | OUTPATIENT
Start: 2023-03-27

## 2023-03-27 NOTE — PROGRESS NOTES
QUICK REFERENCE INFORMATION:  The ABCs of the Annual Wellness Visit    Initial Medicare Wellness Visit    From [place], [place]. Moved to [place] 1991. 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 . Has son and daughter who live in [place].      Patient has a past medical history of hypertension, hyperlipidemia, prediabetes, mild persistent asthma, seasonal allergies and basal cell carcinoma left ear (annual f/u derm Dr. Hernandez).    L arm. Started some months ago. Pain base of L thumb. Last 2 mo forearm and upper. Pectoral. No excertiona. More after workig  With it. No weakness. No neck pain. Some worse arthritis hands.    Patient is here for annual visit.  He is overall doing well.  Some months ago he started having pain around the base of his left thumb, which typically hurts more when he is using the hand a lot.  He has been working on a [employer], about 4 days a week, putting it together.  Over the last couple of months he has also been experiencing some pain up along the left arm, left upper arm and the pectoral area.  He can exert himself without any chest pain, no shortness of breath.  The arm pain and hand pain is related to movement and activity, if he uses it more it hurts more.    Of note patient tells me there was an issue with his lisinopril 5 mg.  It was possibly not sent incorrectly to the pharmacy.  In any case he has been taking lisinopril 10 mg and now tolerating well    His asthma is overall well controlled, uses albuterol 1 or 2 times weekly, typically more with his allergies.  He continues to take Flonase.  He also continues on Symbicort.    HEALTH RISK ASSESSMENT    1955    Recent Hospitalizations:  No hospitalization(s) within the last year..        Current Medical Providers:  Patient Care Team:  Juan Carlos Paredes MD as PCP - General (Family Medicine)  René Hernandez (Dermatology)  Gabino Russell MD as  Consulting Physician (Colon and Rectal Surgery)  Randy Lees DPM as Consulting Physician (Podiatry)  Yolis Finn OD (Optometry)        Smoking Status:  Social History     Tobacco Use   Smoking Status Former   • Packs/day: 2.00   • Years: 15.00   • Pack years: 30.00   • Types: Cigarettes   • Quit date: 1984   • Years since quittin.2   Smokeless Tobacco Never       Alcohol Consumption:  Social History     Substance and Sexual Activity   Alcohol Use Yes    Comment: 10 weekly       Depression Screen:   PHQ-2/PHQ-9 Depression Screening 3/27/2023   Retired PHQ-9 Total Score -   Retired Total Score -   Little Interest or Pleasure in Doing Things 0-->not at all   Feeling Down, Depressed or Hopeless 0-->not at all   PHQ-9: Brief Depression Severity Measure Score 0       Health Habits and Functional and Cognitive Screening:  Functional & Cognitive Status 3/27/2023   Do you have difficulty preparing food and eating? No   Do you have difficulty bathing yourself, getting dressed or grooming yourself? No   Do you have difficulty using the toilet? No   Do you have difficulty moving around from place to place? No   Do you have trouble with steps or getting out of a bed or a chair? No   Current Diet Well Balanced Diet   Dental Exam Up to date   Eye Exam Up to date   Exercise (times per week) 0 times per week   Current Exercises Include No Regular Exercise   Do you need help using the phone?  No   Are you deaf or do you have serious difficulty hearing?  No   Do you need help with transportation? No   Do you need help shopping? No   Do you need help preparing meals?  No   Do you need help with housework?  No   Do you need help with laundry? No   Do you need help taking your medications? No   Do you need help managing money? No   Do you ever drive or ride in a car without wearing a seat belt? No   Have you felt unusual stress, anger or loneliness in the last month? No   Who do you live with? Spouse   If you need  help, do you have trouble finding someone available to you? No   Have you been bothered in the last four weeks by sexual problems? No   Do you have difficulty concentrating, remembering or making decisions? No       Fall Risk Screen:  BRICE Fall Risk Assessment was completed, and patient is at LOW risk for falls.Assessment completed on:3/27/2023    ACE III MINI        Does the patient have evidence of cognitive impairment? No    Asiprin use counseling: Does not need ASA (and currently is not on it)      Recent Lab Results:  CMP:  Lab Results   Component Value Date    BUN 9 03/16/2022    CREATININE 1.03 03/16/2022    BCR 8.7 03/16/2022     03/16/2022    K 4.2 03/16/2022    CO2 28.0 03/16/2022    CALCIUM 10.1 03/16/2022    ALBUMIN 4.50 03/16/2022    BILITOT 1.0 03/16/2022    ALKPHOS 55 03/16/2022    AST 24 03/16/2022    ALT 22 03/16/2022     HbA1c:  Lab Results   Component Value Date    HGBA1C 5.70 (H) 03/16/2022     Microalbumin:  No results found for: MICROALBUR, POCMALB, POCCREAT  Lipid Panel  Lab Results   Component Value Date    CHOL 183 05/04/2022    TRIG 89 05/04/2022    HDL 85 (H) 05/04/2022    LDL 82 05/04/2022    AST 24 03/16/2022    ALT 22 03/16/2022       Visual Acuity:  No results found.    Age-appropriate Screening Schedule:  Refer to the list below for future screening recommendations based on patient's age, sex and/or medical conditions. Orders for these recommended tests are listed in the plan section. The patient has been provided with a written plan.    Health Maintenance   Topic Date Due   • LIPID PANEL  05/04/2023   • ANNUAL WELLNESS VISIT  03/27/2024   • COLORECTAL CANCER SCREENING  02/27/2027   • TDAP/TD VACCINES (2 - Td or Tdap) 02/09/2032   • HEPATITIS C SCREENING  Completed   • COVID-19 Vaccine  Completed   • INFLUENZA VACCINE  Completed   • Pneumococcal Vaccine 65+  Completed   • AAA SCREEN (ONE-TIME)  Completed   • ZOSTER VACCINE  Completed        Subjective   History of Present  Illness    Gabino Gannon is a 67 y.o. male who presents for an Annual Wellness Visit.    CHRONIC CONDITIONS    The following portions of the patient's history were reviewed and updated as appropriate: allergies, current medications, past family history, past medical history, past social history, past surgical history and problem list.    Outpatient Medications Prior to Visit   Medication Sig Dispense Refill   • acetaminophen (TYLENOL) 500 MG tablet Take  by mouth As Needed for Mild Pain .     • Apoaequorin (PREVAGEN PO) Take  by mouth Daily.     • Biotin 1000 MCG tablet Take 1,000 mcg by mouth Daily.     • Boswellia-Glucosamine-Vit D (OSTEO BI-FLEX ONE PER DAY PO) Osteo Bi-Flex   Daily     • Calcium Carbonate Antacid (ANTACID PO) Take  by mouth As Needed.     • cetirizine (zyrTEC) 10 MG tablet Take 1 tablet by mouth Daily. Alternates with allegra     • Cholecalciferol 25 MCG (1000 UT) capsule Vitamin D3 25 mcg (1,000 unit) capsule   Take 1 capsule every day by oral route.     • famotidine (PEPCID) 20 MG tablet Take 1 tablet by mouth Daily.     • fexofenadine (ALLEGRA) 180 MG tablet Take 1 tablet by mouth Daily. Alternates with Cetirizine     • Folic Acid (FOLATE PO) Take  by mouth Daily.     • guaiFENesin (MUCINEX) 600 MG 12 hr tablet As Needed.     • ibuprofen (ADVIL,MOTRIN) 200 MG tablet As Needed.     • Krill Oil 300 MG capsule Daily.     • montelukast (SINGULAIR) 10 MG tablet Take 1 tablet by mouth Every Night. 90 tablet 1   • multivitamin with minerals tablet tablet Take 1 tablet by mouth Daily.     • Probiotic Product (PROBIOTIC DAILY PO) Daily.     • pseudoephedrine (SUDAFED) 30 MG tablet Take 1 tablet by mouth Every 4 (Four) Hours As Needed for Congestion. Should be avoided because of blood pressure. 20 tablet 0   • vitamin B-12 (CYANOCOBALAMIN) 500 MCG tablet Take 1 tablet by mouth Daily.     • albuterol sulfate  (90 Base) MCG/ACT inhaler Inhale 1 puff Every 4 (Four) Hours As Needed for Wheezing.  54 g 1   • atorvastatin (LIPITOR) 20 MG tablet Take 1 tablet by mouth Daily. Stop Zocor 90 tablet 1   • budesonide-formoterol (SYMBICORT) 80-4.5 MCG/ACT inhaler Inhale 2 puffs 2 (Two) Times a Day. 30.6 g 1   • fluticasone (FLONASE) 50 MCG/ACT nasal spray Spray 2 sprays every day by intranasal route as directed 48 g 1   • lisinopril (PRINIVIL,ZESTRIL) 10 MG tablet Take 1 tablet by mouth Daily.     • tiZANidine (ZANAFLEX) 4 MG tablet Take 1 tablet by mouth Daily. (Patient taking differently: Take 1 tablet by mouth Daily. PRN) 90 tablet 1   • ascorbic acid (VITAMIN C) 1000 MG tablet Vitamin C 1,000 mg tablet   Daily (Patient not taking: Reported on 3/27/2023)     • lisinopril (PRINIVIL,ZESTRIL) 5 MG tablet Take 2 tablets by mouth Daily. (Patient not taking: Reported on 3/27/2023) 90 tablet 1     No facility-administered medications prior to visit.       Patient Active Problem List   Diagnosis   • Allergic rhinitis   • Essential hypertension   • Mixed hyperlipidemia   • Mild persistent asthma without complication   • BCC (basal cell carcinoma), ear, left   • Overweight (BMI 25.0-29.9)   • Prediabetes       Advance Care Planning:  ACP discussion was held with the patient during this visit. Patient has an advance directive (not in EMR), copy requested.    Identification of Risk Factors:  Risk factors include: Advance Directive Discussion  Obesity/Overweight .    Review of Systems    Compared to one year ago, the patient feels his physical health is the same.  Compared to one year ago, the patient feels his mental health is the same.    Objective     Physical Exam  Vitals reviewed.   Constitutional:       Appearance: Normal appearance.   HENT:      Head: Normocephalic and atraumatic.      Nose: No congestion.   Eyes:      Extraocular Movements: Extraocular movements intact.      Conjunctiva/sclera: Conjunctivae normal.   Cardiovascular:      Rate and Rhythm: Normal rate and regular rhythm.      Heart sounds: Normal heart  "sounds. No murmur heard.  Pulmonary:      Effort: Pulmonary effort is normal.      Breath sounds: Normal breath sounds.   Abdominal:      General: There is no distension.      Palpations: Abdomen is soft. There is no mass.      Tenderness: There is no abdominal tenderness.   Musculoskeletal:      Cervical back: Neck supple.      Right lower leg: No edema.      Left lower leg: No edema.   Skin:     General: Skin is warm and dry.   Neurological:      Mental Status: He is alert and oriented to person, place, and time. Mental status is at baseline.   Psychiatric:         Behavior: Behavior normal.         Thought Content: Thought content normal.          Procedures     Vitals:    03/27/23 0831   BP: 120/82   BP Location: Left arm   Patient Position: Sitting   Cuff Size: Adult   Pulse: 68   Temp: 98.2 °F (36.8 °C)   TempSrc: Temporal   Weight: 86.6 kg (191 lb)   Height: 181.5 cm (71.46\")   PainSc:   3   PainLoc: Arm       BMI is >= 25 and <30. (Overweight) The following options were offered after discussion;: exercise counseling/recommendations      Assessment & Plan     1. Initial Medicare annual wellness visit  Patient is up-to-date on healthcare maintenance    2. Essential hypertension  BP Readings from Last 3 Encounters:   03/27/23 120/82   03/23/22 122/82   01/26/22 136/82   Blood pressure is overall well controlled and at goal.  We will continue on lisinopril 10 mg.  We will recheck blood work today.  - Comprehensive Metabolic Panel; Future  - lisinopril (PRINIVIL,ZESTRIL) 10 MG tablet; Take 1 tablet by mouth Daily.  Dispense: 90 tablet; Refill: 3    3. Mixed hyperlipidemia  Patient is fasting for lipids today.  We will continue on atorvastatin 20 mg  - Lipid Panel; Future  - atorvastatin (LIPITOR) 20 MG tablet; Take 1 tablet by mouth Daily. Stop Zocor  Dispense: 90 tablet; Refill: 3    4. Prediabetes  Hemoglobin A1C   Date Value Ref Range Status   03/16/2022 5.70 (H) 4.80 - 5.60 % Final   We will recheck A1c " today.  - Hemoglobin A1c; Future    5. Mild persistent asthma without complication  Well-controlled.  Continue on Symbicort and albuterol as needed  - albuterol sulfate  (90 Base) MCG/ACT inhaler; Inhale 1 puff Every 4 (Four) Hours As Needed for Wheezing.  Dispense: 54 g; Refill: 3  - budesonide-formoterol (SYMBICORT) 80-4.5 MCG/ACT inhaler; Inhale 2 puffs 2 (Two) Times a Day.  Dispense: 30.6 g; Refill: 3    6. Overweight (BMI 25.0-29.9)    7. Screening PSA (prostate specific antigen)  Patient wants to continue prostate cancer screening  - PSA Screen; Future    8. Allergic rhinitis, unspecified seasonality, unspecified trigger  Overall stable.  - fluticasone (FLONASE) 50 MCG/ACT nasal spray; Spray 2 sprays every day by intranasal route as directed  Dispense: 48 g; Refill: 3        Patient Self-Management and Personalized Health Advice  The patient has been provided with information about: exercise and preventive services including:   · Annual Wellness Visit (AWV).    Outpatient Encounter Medications as of 3/27/2023   Medication Sig Dispense Refill   • acetaminophen (TYLENOL) 500 MG tablet Take  by mouth As Needed for Mild Pain .     • albuterol sulfate  (90 Base) MCG/ACT inhaler Inhale 1 puff Every 4 (Four) Hours As Needed for Wheezing. 54 g 3   • Apoaequorin (PREVAGEN PO) Take  by mouth Daily.     • atorvastatin (LIPITOR) 20 MG tablet Take 1 tablet by mouth Daily. Stop Zocor 90 tablet 3   • Biotin 1000 MCG tablet Take 1,000 mcg by mouth Daily.     • Boswellia-Glucosamine-Vit D (OSTEO BI-FLEX ONE PER DAY PO) Osteo Bi-Flex   Daily     • budesonide-formoterol (SYMBICORT) 80-4.5 MCG/ACT inhaler Inhale 2 puffs 2 (Two) Times a Day. 30.6 g 3   • Calcium Carbonate Antacid (ANTACID PO) Take  by mouth As Needed.     • cetirizine (zyrTEC) 10 MG tablet Take 1 tablet by mouth Daily. Alternates with allegra     • Cholecalciferol 25 MCG (1000 UT) capsule Vitamin D3 25 mcg (1,000 unit) capsule   Take 1 capsule every  day by oral route.     • famotidine (PEPCID) 20 MG tablet Take 1 tablet by mouth Daily.     • fexofenadine (ALLEGRA) 180 MG tablet Take 1 tablet by mouth Daily. Alternates with Cetirizine     • fluticasone (FLONASE) 50 MCG/ACT nasal spray Spray 2 sprays every day by intranasal route as directed 48 g 3   • Folic Acid (FOLATE PO) Take  by mouth Daily.     • guaiFENesin (MUCINEX) 600 MG 12 hr tablet As Needed.     • ibuprofen (ADVIL,MOTRIN) 200 MG tablet As Needed.     • Krill Oil 300 MG capsule Daily.     • lisinopril (PRINIVIL,ZESTRIL) 10 MG tablet Take 1 tablet by mouth Daily. 90 tablet 3   • montelukast (SINGULAIR) 10 MG tablet Take 1 tablet by mouth Every Night. 90 tablet 1   • multivitamin with minerals tablet tablet Take 1 tablet by mouth Daily.     • Probiotic Product (PROBIOTIC DAILY PO) Daily.     • pseudoephedrine (SUDAFED) 30 MG tablet Take 1 tablet by mouth Every 4 (Four) Hours As Needed for Congestion. Should be avoided because of blood pressure. 20 tablet 0   • tiZANidine (ZANAFLEX) 4 MG tablet Take 1 tablet by mouth Daily As Needed for Muscle Spasms. 90 tablet 1   • vitamin B-12 (CYANOCOBALAMIN) 500 MCG tablet Take 1 tablet by mouth Daily.     • [DISCONTINUED] albuterol sulfate  (90 Base) MCG/ACT inhaler Inhale 1 puff Every 4 (Four) Hours As Needed for Wheezing. 54 g 1   • [DISCONTINUED] atorvastatin (LIPITOR) 20 MG tablet Take 1 tablet by mouth Daily. Stop Zocor 90 tablet 1   • [DISCONTINUED] budesonide-formoterol (SYMBICORT) 80-4.5 MCG/ACT inhaler Inhale 2 puffs 2 (Two) Times a Day. 30.6 g 1   • [DISCONTINUED] fluticasone (FLONASE) 50 MCG/ACT nasal spray Spray 2 sprays every day by intranasal route as directed 48 g 1   • [DISCONTINUED] lisinopril (PRINIVIL,ZESTRIL) 10 MG tablet Take 1 tablet by mouth Daily.     • [DISCONTINUED] tiZANidine (ZANAFLEX) 4 MG tablet Take 1 tablet by mouth Daily. (Patient taking differently: Take 1 tablet by mouth Daily. PRN) 90 tablet 1   • ascorbic acid (VITAMIN C)  1000 MG tablet Vitamin C 1,000 mg tablet   Daily (Patient not taking: Reported on 3/27/2023)     • [DISCONTINUED] lisinopril (PRINIVIL,ZESTRIL) 5 MG tablet Take 2 tablets by mouth Daily. (Patient not taking: Reported on 3/27/2023) 90 tablet 1     No facility-administered encounter medications on file as of 3/27/2023.       Reviewed use of high risk medication in the elderly: yes  Reviewed for potential of harmful drug interactions in the elderly: yes    Follow Up:  Return in about 1 year (around 3/27/2024), or if symptoms worsen or fail to improve, for Medicare Wellness.     Future Appointments       Provider Department Center    3/29/2024 8:00 AM Juan Carlos Paredes MD [facility] INTERNAL MEDICINE TIA          There are no Patient Instructions on file for this visit.    An After Visit Summary and PPPS with all of these plans were given to the patient.      Juan Carlos Paredes MD

## 2023-04-10 ENCOUNTER — PATIENT MESSAGE (OUTPATIENT)
Dept: INTERNAL MEDICINE | Facility: CLINIC | Age: 68
End: 2023-04-10
Payer: COMMERCIAL

## 2023-04-10 RX ORDER — MONTELUKAST SODIUM 10 MG/1
10 TABLET ORAL NIGHTLY
Qty: 90 TABLET | Refills: 1 | Status: SHIPPED | OUTPATIENT
Start: 2023-04-10

## 2023-04-10 NOTE — TELEPHONE ENCOUNTER
From: Gabino Gannon  To: Juan Carlos Paredes  Sent: 4/10/2023 8:12 AM EDT  Subject: Refill needed    Good morning Dr. Paredes. It looks like my prescription for montekulast was overlooked and not sent in for refill following my last appointment. I received all of my other meds from [facility] as expected. Please send in a refill request when you get a chance. Thank you.

## 2023-04-21 ENCOUNTER — OFFICE VISIT (OUTPATIENT)
Dept: INTERNAL MEDICINE | Facility: CLINIC | Age: 68
End: 2023-04-21
Payer: MEDICARE

## 2023-04-21 VITALS
BODY MASS INDEX: 26.94 KG/M2 | WEIGHT: 192.4 LBS | DIASTOLIC BLOOD PRESSURE: 70 MMHG | HEIGHT: 71 IN | SYSTOLIC BLOOD PRESSURE: 138 MMHG | TEMPERATURE: 98.7 F | HEART RATE: 92 BPM

## 2023-04-21 DIAGNOSIS — J22 LOWER RESPIRATORY INFECTION: Primary | ICD-10-CM

## 2023-04-21 DIAGNOSIS — J45.31 MILD PERSISTENT ASTHMA WITH ACUTE EXACERBATION: Chronic | ICD-10-CM

## 2023-04-21 LAB
EXPIRATION DATE: NORMAL
FLUAV AG UPPER RESP QL IA.RAPID: NOT DETECTED
FLUBV AG UPPER RESP QL IA.RAPID: NOT DETECTED
INTERNAL CONTROL: NORMAL
Lab: NORMAL
SARS-COV-2 AG UPPER RESP QL IA.RAPID: NOT DETECTED

## 2023-04-21 PROCEDURE — 1159F MED LIST DOCD IN RCRD: CPT | Performed by: STUDENT IN AN ORGANIZED HEALTH CARE EDUCATION/TRAINING PROGRAM

## 2023-04-21 PROCEDURE — 87428 SARSCOV & INF VIR A&B AG IA: CPT | Performed by: STUDENT IN AN ORGANIZED HEALTH CARE EDUCATION/TRAINING PROGRAM

## 2023-04-21 PROCEDURE — 99214 OFFICE O/P EST MOD 30 MIN: CPT | Performed by: STUDENT IN AN ORGANIZED HEALTH CARE EDUCATION/TRAINING PROGRAM

## 2023-04-21 PROCEDURE — 3075F SYST BP GE 130 - 139MM HG: CPT | Performed by: STUDENT IN AN ORGANIZED HEALTH CARE EDUCATION/TRAINING PROGRAM

## 2023-04-21 PROCEDURE — 1160F RVW MEDS BY RX/DR IN RCRD: CPT | Performed by: STUDENT IN AN ORGANIZED HEALTH CARE EDUCATION/TRAINING PROGRAM

## 2023-04-21 PROCEDURE — 3078F DIAST BP <80 MM HG: CPT | Performed by: STUDENT IN AN ORGANIZED HEALTH CARE EDUCATION/TRAINING PROGRAM

## 2023-04-21 RX ORDER — DEXTROMETHORPHAN HYDROBROMIDE AND PROMETHAZINE HYDROCHLORIDE 15; 6.25 MG/5ML; MG/5ML
5 SYRUP ORAL 4 TIMES DAILY PRN
Qty: 240 ML | Refills: 0 | Status: SHIPPED | OUTPATIENT
Start: 2023-04-21

## 2023-04-21 RX ORDER — PREDNISONE 10 MG/1
TABLET ORAL
Qty: 18 TABLET | Refills: 0 | Status: SHIPPED | OUTPATIENT
Start: 2023-04-21

## 2023-04-21 RX ORDER — AZITHROMYCIN 250 MG/1
TABLET, FILM COATED ORAL
Qty: 6 TABLET | Refills: 0 | Status: SHIPPED | OUTPATIENT
Start: 2023-04-21

## 2023-04-21 NOTE — PROGRESS NOTES
"Chief Complaint  Gabino Gannon is a 67 y.o. male presenting for URI.     From [place], [place]. Moved to [place] 1991. 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 . Has son and daughter who live in [place].      Patient has a past medical history of hypertension, hyperlipidemia, prediabetes, mild persistent asthma, seasonal allergies and basal cell carcinoma left ear (annual f/u derm Dr. Hernandez).    History of Present Illness  Patient is here for acute visit.    His grandson was sick, coughing and congested about 2 weeks ago.  Patient started on Wednesday, 4/12/2023 with sinus pressure, headache, chest congestion, coughing with phlegm.  This was worse at night, feeling like he has to cough, tickly feeling of his throat.  No sore throat.  He wakes up coughing through the night.  Delsym has helped somewhat, but still symptomatic.  He feels shallow breathing is better and does not trigger cough.  He did COVID testing at home that was negative.  Last night he was experiencing chills.  Temperature 98.3 F, SPO2 97% at home.  He has been using his inhaler more frequently.  Mild tightness of the chest, no chest pain.  He is able to do activities, no significant dyspnea on exertion.  He was also experiencing some body aches.  He has started feeling better, but the last 2 days he has gotten worse.  No nausea, vomiting or diarrhea.  No change to taste or smell.  Appetite is good, but significant fatigue.  He has not been on steroids in the past.    The following portions of the patient's history were reviewed and updated as appropriate: allergies, current medications, past family history, past medical history, past social history, past surgical history and problem list.    Objective  /70 (BP Location: Left arm, Patient Position: Sitting, Cuff Size: Adult)   Pulse 92   Temp 98.7 °F (37.1 °C) (Temporal)   Ht 181.5 cm (71.46\")   Wt 87.3 " kg (192 lb 6.4 oz)   BMI 26.49 kg/m²     Physical Exam  Vitals reviewed.   Constitutional:       Appearance: Normal appearance.   HENT:      Head: Normocephalic and atraumatic.      Right Ear: Tympanic membrane, ear canal and external ear normal. There is no impacted cerumen.      Left Ear: Tympanic membrane, ear canal and external ear normal. There is no impacted cerumen.      Nose: Congestion present.   Eyes:      Extraocular Movements: Extraocular movements intact.      Conjunctiva/sclera: Conjunctivae normal.   Cardiovascular:      Rate and Rhythm: Normal rate and regular rhythm.      Heart sounds: Normal heart sounds. No murmur heard.  Pulmonary:      Effort: Pulmonary effort is normal. No respiratory distress.      Breath sounds: Wheezing present.   Musculoskeletal:      Cervical back: Neck supple.   Skin:     General: Skin is warm and dry.   Neurological:      Mental Status: He is alert and oriented to person, place, and time. Mental status is at baseline.   Psychiatric:         Behavior: Behavior normal.         Thought Content: Thought content normal.         Assessment/Plan   1. Mild persistent asthma with acute exacerbation  2. Lower respiratory infection  Concern for asthma exacerbation and possible developing lower respiratory infection, potentially bacterial infection.  He has gotten worse last couple of days, so I recommend starting on Z-Adarsh, prednisone for his worsening asthma, and cough syrup for symptom relief.  If any worsening symptoms over the next few days he should get back in touch, if any severe symptoms he should consider going to the emergency room.  - predniSONE (DELTASONE) 10 MG tablet; 60 mg day 1-2, 20 mg day 3-4, 10 mg day 5-6  Dispense: 18 tablet; Refill: 0  - POCT SARS-CoV-2 Antigen PHAN  - azithromycin (Zithromax Z-Adarsh) 250 MG tablet; Take 2 tablets by mouth on day 1, then 1 tablet daily on days 2-5  Dispense: 6 tablet; Refill: 0  - promethazine-dextromethorphan (PROMETHAZINE-DM)  6.25-15 MG/5ML syrup; Take 5 mL by mouth 4 (Four) Times a Day As Needed for Cough.  Dispense: 240 mL; Refill: 0      Return if symptoms worsen or fail to improve, for Next scheduled follow up.    Future Appointments       Provider Department Center    3/29/2024 8:00 AM Juan Carlos Paredes MD [facility] INTERNAL MEDICINE TIA          Juan Carlos Paredes MD  Family Medicine  04/21/2023

## 2023-12-06 RX ORDER — MONTELUKAST SODIUM 10 MG/1
10 TABLET ORAL NIGHTLY
Qty: 90 TABLET | Refills: 1 | Status: SHIPPED | OUTPATIENT
Start: 2023-12-06

## 2024-03-27 ENCOUNTER — LAB (OUTPATIENT)
Dept: LAB | Facility: HOSPITAL | Age: 69
End: 2024-03-27
Payer: MEDICARE

## 2024-03-27 DIAGNOSIS — R73.03 PREDIABETES: Chronic | ICD-10-CM

## 2024-03-27 DIAGNOSIS — I10 ESSENTIAL HYPERTENSION: Chronic | ICD-10-CM

## 2024-03-27 DIAGNOSIS — Z12.5 SCREENING PSA (PROSTATE SPECIFIC ANTIGEN): ICD-10-CM

## 2024-03-27 DIAGNOSIS — E78.2 MIXED HYPERLIPIDEMIA: Chronic | ICD-10-CM

## 2024-03-27 LAB
ALBUMIN SERPL-MCNC: 4.2 G/DL (ref 3.5–5.2)
ALBUMIN/GLOB SERPL: 2.2 G/DL
ALP SERPL-CCNC: 52 U/L (ref 39–117)
ALT SERPL W P-5'-P-CCNC: 30 U/L (ref 1–41)
ANION GAP SERPL CALCULATED.3IONS-SCNC: 12.6 MMOL/L (ref 5–15)
AST SERPL-CCNC: 36 U/L (ref 1–40)
BILIRUB SERPL-MCNC: 1.2 MG/DL (ref 0–1.2)
BUN SERPL-MCNC: 9 MG/DL (ref 8–23)
BUN/CREAT SERPL: 7.9 (ref 7–25)
CALCIUM SPEC-SCNC: 9 MG/DL (ref 8.6–10.5)
CHLORIDE SERPL-SCNC: 106 MMOL/L (ref 98–107)
CHOLEST SERPL-MCNC: 168 MG/DL (ref 0–200)
CO2 SERPL-SCNC: 23.4 MMOL/L (ref 22–29)
CREAT SERPL-MCNC: 1.14 MG/DL (ref 0.76–1.27)
EGFRCR SERPLBLD CKD-EPI 2021: 70.5 ML/MIN/1.73
GLOBULIN UR ELPH-MCNC: 1.9 GM/DL
GLUCOSE SERPL-MCNC: 99 MG/DL (ref 65–99)
HBA1C MFR BLD: 5.5 % (ref 4.8–5.6)
HDLC SERPL-MCNC: 84 MG/DL (ref 40–60)
LDLC SERPL CALC-MCNC: 69 MG/DL (ref 0–100)
LDLC/HDLC SERPL: 0.81 {RATIO}
POTASSIUM SERPL-SCNC: 4 MMOL/L (ref 3.5–5.2)
PROT SERPL-MCNC: 6.1 G/DL (ref 6–8.5)
PSA SERPL-MCNC: 1.6 NG/ML (ref 0–4)
SODIUM SERPL-SCNC: 142 MMOL/L (ref 136–145)
TRIGL SERPL-MCNC: 81 MG/DL (ref 0–150)
VLDLC SERPL-MCNC: 15 MG/DL (ref 5–40)

## 2024-03-27 PROCEDURE — 80061 LIPID PANEL: CPT

## 2024-03-27 PROCEDURE — G0103 PSA SCREENING: HCPCS

## 2024-03-27 PROCEDURE — 83036 HEMOGLOBIN GLYCOSYLATED A1C: CPT

## 2024-03-27 PROCEDURE — 80053 COMPREHEN METABOLIC PANEL: CPT

## 2024-03-29 ENCOUNTER — OFFICE VISIT (OUTPATIENT)
Dept: INTERNAL MEDICINE | Facility: CLINIC | Age: 69
End: 2024-03-29
Payer: MEDICARE

## 2024-03-29 ENCOUNTER — HOSPITAL ENCOUNTER (OUTPATIENT)
Dept: GENERAL RADIOLOGY | Facility: HOSPITAL | Age: 69
Discharge: HOME | End: 2024-03-29
Payer: MEDICARE

## 2024-03-29 ENCOUNTER — LAB (OUTPATIENT)
Dept: LAB | Facility: HOSPITAL | Age: 69
End: 2024-03-29
Payer: MEDICARE

## 2024-03-29 VITALS
DIASTOLIC BLOOD PRESSURE: 82 MMHG | HEART RATE: 82 BPM | BODY MASS INDEX: 26.57 KG/M2 | TEMPERATURE: 97.8 F | HEIGHT: 71 IN | WEIGHT: 189.8 LBS | SYSTOLIC BLOOD PRESSURE: 122 MMHG

## 2024-03-29 DIAGNOSIS — G89.29 CHRONIC PAIN OF RIGHT KNEE: ICD-10-CM

## 2024-03-29 DIAGNOSIS — Z00.00 ENCOUNTER FOR SUBSEQUENT ANNUAL WELLNESS VISIT (AWV) IN MEDICARE PATIENT: Primary | ICD-10-CM

## 2024-03-29 DIAGNOSIS — R73.03 PREDIABETES: Chronic | ICD-10-CM

## 2024-03-29 DIAGNOSIS — G89.29 CHRONIC PAIN OF LEFT THUMB: ICD-10-CM

## 2024-03-29 DIAGNOSIS — Z12.11 COLON CANCER SCREENING: ICD-10-CM

## 2024-03-29 DIAGNOSIS — J30.9 ALLERGIC RHINITIS, UNSPECIFIED SEASONALITY, UNSPECIFIED TRIGGER: Chronic | ICD-10-CM

## 2024-03-29 DIAGNOSIS — E66.3 OVERWEIGHT (BMI 25.0-29.9): ICD-10-CM

## 2024-03-29 DIAGNOSIS — M25.561 CHRONIC PAIN OF RIGHT KNEE: ICD-10-CM

## 2024-03-29 DIAGNOSIS — M79.645 CHRONIC PAIN OF LEFT THUMB: ICD-10-CM

## 2024-03-29 DIAGNOSIS — J45.30 MILD PERSISTENT ASTHMA WITHOUT COMPLICATION: Chronic | ICD-10-CM

## 2024-03-29 DIAGNOSIS — R19.4 CHANGE IN STOOL HABITS: ICD-10-CM

## 2024-03-29 DIAGNOSIS — I10 ESSENTIAL HYPERTENSION: Chronic | ICD-10-CM

## 2024-03-29 DIAGNOSIS — K62.5 RECTAL BLEEDING: ICD-10-CM

## 2024-03-29 DIAGNOSIS — E78.2 MIXED HYPERLIPIDEMIA: Chronic | ICD-10-CM

## 2024-03-29 LAB
DEPRECATED RDW RBC AUTO: 44 FL (ref 37–54)
ERYTHROCYTE [DISTWIDTH] IN BLOOD BY AUTOMATED COUNT: 14.5 % (ref 12.3–15.4)
HCT VFR BLD AUTO: 41.7 % (ref 37.5–51)
HGB BLD-MCNC: 13.4 G/DL (ref 13–17.7)
MCH RBC QN AUTO: 26.9 PG (ref 26.6–33)
MCHC RBC AUTO-ENTMCNC: 32.1 G/DL (ref 31.5–35.7)
MCV RBC AUTO: 83.6 FL (ref 79–97)
PLATELET # BLD AUTO: 290 10*3/MM3 (ref 140–450)
PMV BLD AUTO: 9.8 FL (ref 6–12)
RBC # BLD AUTO: 4.99 10*6/MM3 (ref 4.14–5.8)
WBC NRBC COR # BLD AUTO: 4.1 10*3/MM3 (ref 3.4–10.8)

## 2024-03-29 PROCEDURE — 73130 X-RAY EXAM OF HAND: CPT

## 2024-03-29 PROCEDURE — 73560 X-RAY EXAM OF KNEE 1 OR 2: CPT

## 2024-03-29 PROCEDURE — 85027 COMPLETE CBC AUTOMATED: CPT

## 2024-03-29 PROCEDURE — 36415 COLL VENOUS BLD VENIPUNCTURE: CPT

## 2024-03-29 RX ORDER — ALBUTEROL SULFATE 90 UG/1
1 AEROSOL, METERED RESPIRATORY (INHALATION) EVERY 4 HOURS PRN
Qty: 54 G | Refills: 3 | Status: SHIPPED | OUTPATIENT
Start: 2024-03-29

## 2024-03-29 RX ORDER — BUDESONIDE AND FORMOTEROL FUMARATE DIHYDRATE 80; 4.5 UG/1; UG/1
2 AEROSOL RESPIRATORY (INHALATION) 2 TIMES DAILY
Qty: 30.6 G | Refills: 3 | Status: SHIPPED | OUTPATIENT
Start: 2024-03-29

## 2024-03-29 RX ORDER — TIZANIDINE 4 MG/1
4 TABLET ORAL DAILY PRN
Qty: 90 TABLET | Refills: 3 | Status: SHIPPED | OUTPATIENT
Start: 2024-03-29

## 2024-03-29 RX ORDER — FLUTICASONE PROPIONATE 50 MCG
SPRAY, SUSPENSION (ML) NASAL
Qty: 48 G | Refills: 3 | Status: SHIPPED | OUTPATIENT
Start: 2024-03-29

## 2024-03-29 RX ORDER — LISINOPRIL 10 MG/1
10 TABLET ORAL DAILY
Qty: 90 TABLET | Refills: 3 | Status: SHIPPED | OUTPATIENT
Start: 2024-03-29

## 2024-03-29 RX ORDER — MONTELUKAST SODIUM 10 MG/1
10 TABLET ORAL NIGHTLY
Qty: 90 TABLET | Refills: 3 | Status: SHIPPED | OUTPATIENT
Start: 2024-03-29

## 2024-03-29 RX ORDER — ATORVASTATIN CALCIUM 20 MG/1
20 TABLET, FILM COATED ORAL DAILY
Qty: 90 TABLET | Refills: 3 | Status: SHIPPED | OUTPATIENT
Start: 2024-03-29

## 2024-03-29 NOTE — PROGRESS NOTES
QUICK REFERENCE INFORMATION:  The ABCs of the Annual Wellness Visit    Subsequent Medicare Wellness Visit    From [place], [place]. Moved to [place] 1991. 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 Has worked with [employer] at [employer] from 1991 until retiring 2011. . Has son and daughter who live in [place].      Patient has a past medical history of hypertension, hyperlipidemia, prediabetes, mild persistent asthma, seasonal allergies and basal cell carcinoma left ear (annual f/u derm Dr. Hernandez).    Patient is here for annual Medicare visit and annual follow-up of his chronic health conditions.    Patient has a couple of new concerns.  After [place] he was up in the attic, crawling around, and afterwards his right knee started bothering him.  He did not have any clear injury, he is able to walk, but continues to have pain on the medial side especially.  Worse when sitting down on the floor or twisting his knee getting up.  He has several family members with osteoarthritis and knee/hip replacements, including his younger brother.    Patient also reports worsening pain of his left hand at the base of his thumb.  His bothers him every day, any movement causes pain.  He would like to see hand specialist at this time.    Otherwise patient is physically active, he exercises during the winter, and during the summer he is very active outside.  Sometimes he goes on the treadmill.  Recently he has been sitting down pavers in his backyard.    Patient also reports worsening hemorrhoids, he often has bleedings, 2 times over the last month.  He also has had some change to his stools, sometimes he is constipated, sometimes normal.  He takes Dulcolax once or twice weekly.    His asthma is doing well, he uses albuterol once or twice monthly, sometimes more in the fall.  His allergies are typically worse in the spring with runny nose, itching and sneezing.    HEALTH RISK ASSESSMENT    1955    Recent  Hospitalizations:  No hospitalization(s) within the last year..        Current Medical Providers:  Patient Care Team:  Juan Carlos Paredes MD as PCP - General (Family Medicine)  René Hernandez (Dermatology)  Gabino Russell MD as Consulting Physician (Colon and Rectal Surgery)  Randy Triplett DPM as Consulting Physician (Podiatry)  Yolis Finn OD (Optometry)  Gabino Russell MD as Consulting Physician (Colon and Rectal Surgery)        Smoking Status:  Social History     Tobacco Use   Smoking Status Former    Current packs/day: 0.00    Average packs/day: 2.0 packs/day for 15.0 years (30.0 ttl pk-yrs)    Types: Cigarettes    Start date: 1969    Quit date: 1984    Years since quittin.2   Smokeless Tobacco Never       Alcohol Consumption:  Social History     Substance and Sexual Activity   Alcohol Use Yes    Comment: 10 weekly       Depression Screen:       3/29/2024     8:14 AM   PHQ-2/PHQ-9 Depression Screening   Little Interest or Pleasure in Doing Things 0-->not at all   Feeling Down, Depressed or Hopeless 0-->not at all   PHQ-9: Brief Depression Severity Measure Score 0       Health Habits and Functional and Cognitive Screening:      3/29/2024     8:13 AM   Functional & Cognitive Status   Do you have difficulty preparing food and eating? No   Do you have difficulty bathing yourself, getting dressed or grooming yourself? No   Do you have difficulty using the toilet? No   Do you have difficulty moving around from place to place? No   Do you have trouble with steps or getting out of a bed or a chair? No   Current Diet Well Balanced Diet   Dental Exam Up to date   Eye Exam Up to date   Exercise (times per week) 4 times per week   Current Exercises Include Treadmill   Do you need help using the phone?  No   Are you deaf or do you have serious difficulty hearing?  No   Do you need help to go to places out of walking distance? No   Do you need help shopping? No   Do you need help preparing  "meals?  No   Do you need help with housework?  No   Do you need help with laundry? No   Do you need help taking your medications? No   Do you need help managing money? No   Do you ever drive or ride in a car without wearing a seat belt? No   Have you felt unusual stress, anger or loneliness in the last month? No   Who do you live with? Spouse   If you need help, do you have trouble finding someone available to you? No   Have you been bothered in the last four weeks by sexual problems? No   Do you have difficulty concentrating, remembering or making decisions? No       Fall Risk Screen:  [facility], and patient is at LOW risk for falls.Assessment completed on:3/29/2024    ACE III MINI        Does the patient have evidence of cognitive impairment? No    Aspirin use counseling: Does not need ASA (and currently is not on it)    Recent Lab Results:  CMP:  Lab Results   Component Value Date    BUN 9 03/27/2024    CREATININE 1.14 03/27/2024    BCR 7.9 03/27/2024     03/27/2024    K 4.0 03/27/2024    CO2 23.4 03/27/2024    CALCIUM 9.0 03/27/2024    ALBUMIN 4.2 03/27/2024    BILITOT 1.2 03/27/2024    ALKPHOS 52 03/27/2024    AST 36 03/27/2024    ALT 30 03/27/2024     HbA1c:  Lab Results   Component Value Date    HGBA1C 5.50 03/27/2024    HGBA1C 5.50 03/27/2023     Microalbumin:  No results found for: \"MICROALBUR\", \"POCMALB\", \"POCCREAT\"  Lipid Panel  Lab Results   Component Value Date    CHOL 168 03/27/2024    TRIG 81 03/27/2024    HDL 84 (H) 03/27/2024    LDL 69 03/27/2024    AST 36 03/27/2024    ALT 30 03/27/2024       Visual Acuity:  No results found.    Age-appropriate Screening Schedule:  Refer to the list below for future screening recommendations based on patient's age, sex and/or medical conditions. Orders for these recommended tests are listed in the plan section. The patient has been provided with a written plan.    Health Maintenance   Topic Date Due    COVID-19 Vaccine (8 - " 2023-24 season) 11/13/2023    LIPID PANEL  03/27/2025    ANNUAL WELLNESS VISIT  03/29/2025    BMI FOLLOWUP  03/29/2025    COLORECTAL CANCER SCREENING  02/27/2027    TDAP/TD VACCINES (2 - Td or Tdap) 02/09/2032    HEPATITIS C SCREENING  Completed    RSV Vaccine - Adults  Completed    INFLUENZA VACCINE  Completed    Pneumococcal Vaccine 65+  Completed    AAA SCREEN (ONE-TIME)  Completed    ZOSTER VACCINE  Completed        Subjective   History of Present Illness    Gabino Gannon is a 68 y.o. male who presents for a Subsequent Wellness Visit.    CHRONIC CONDITIONS    The following portions of the patient's history were reviewed and updated as appropriate: allergies, current medications, past family history, past medical history, past social history, past surgical history, and problem list.    Outpatient Medications Prior to Visit   Medication Sig Dispense Refill    acetaminophen (TYLENOL) 500 MG tablet Take  by mouth As Needed for Mild Pain .      Apoaequorin (PREVAGEN PO) Take  by mouth Daily.      Boswellia-Glucosamine-Vit D (OSTEO BI-FLEX ONE PER DAY PO) Osteo Bi-Flex   Daily      Calcium Carbonate Antacid (ANTACID PO) Take  by mouth As Needed.      cetirizine (zyrTEC) 10 MG tablet Take 1 tablet by mouth Daily. Alternates with allegra      Cholecalciferol 25 MCG (1000 UT) capsule Vitamin D3 25 mcg (1,000 unit) capsule   Take 1 capsule every day by oral route.      famotidine (PEPCID) 20 MG tablet Take 1 tablet by mouth Daily.      fexofenadine (ALLEGRA) 180 MG tablet Take 1 tablet by mouth Daily. Alternates with Cetirizine      guaiFENesin (MUCINEX) 600 MG 12 hr tablet As Needed.      ibuprofen (ADVIL,MOTRIN) 200 MG tablet As Needed.      Krill Oil 300 MG capsule Daily.      multivitamin with minerals tablet tablet Take 1 tablet by mouth Daily.      Probiotic Product (PROBIOTIC DAILY PO) Daily.      pseudoephedrine (SUDAFED) 30 MG tablet Take 1 tablet by mouth Every 4 (Four) Hours As Needed for Congestion. Should  be avoided because of blood pressure. 20 tablet 0    vitamin B-12 (CYANOCOBALAMIN) 500 MCG tablet Take 1 tablet by mouth Daily.      albuterol sulfate  (90 Base) MCG/ACT inhaler Inhale 1 puff Every 4 (Four) Hours As Needed for Wheezing. 54 g 3    atorvastatin (LIPITOR) 20 MG tablet Take 1 tablet by mouth Daily. Stop Zocor 90 tablet 3    azithromycin (Zithromax Z-Adarsh) 250 MG tablet Take 2 tablets by mouth on day 1, then 1 tablet daily on days 2-5 6 tablet 0    budesonide-formoterol (SYMBICORT) 80-4.5 MCG/ACT inhaler Inhale 2 puffs 2 (Two) Times a Day. 30.6 g 3    fluticasone (FLONASE) 50 MCG/ACT nasal spray Spray 2 sprays every day by intranasal route as directed 48 g 3    lisinopril (PRINIVIL,ZESTRIL) 10 MG tablet Take 1 tablet by mouth Daily. 90 tablet 3    montelukast (SINGULAIR) 10 MG tablet Take 1 tablet by mouth Every Night. 90 tablet 1    predniSONE (DELTASONE) 10 MG tablet 60 mg day 1-2, 20 mg day 3-4, 10 mg day 5-6 18 tablet 0    promethazine-dextromethorphan (PROMETHAZINE-DM) 6.25-15 MG/5ML syrup Take 5 mL by mouth 4 (Four) Times a Day As Needed for Cough. 240 mL 0    tiZANidine (ZANAFLEX) 4 MG tablet Take 1 tablet by mouth Daily As Needed for Muscle Spasms. 90 tablet 1    Biotin 1000 MCG tablet Take 1,000 mcg by mouth Daily. (Patient not taking: Reported on 3/29/2024)      Folic Acid (FOLATE PO) Take  by mouth Daily. (Patient not taking: Reported on 3/29/2024)       No facility-administered medications prior to visit.       Patient Active Problem List   Diagnosis    Allergic rhinitis    Essential hypertension    Mixed hyperlipidemia    Mild persistent asthma without complication    BCC (basal cell carcinoma), ear, left    Overweight (BMI 25.0-29.9)    Prediabetes       Advance Care Planning:  ACP discussion was held with the patient during this visit. Patient has an advance directive (not in EMR), copy requested.    Identification of Risk Factors:  Risk factors include: Advance Directive  Discussion  Immunizations Discussed/Encouraged (specific immunizations; COVID19 )  Obesity/Overweight .    Review of Systems    Compared to one year ago, the patient feels his physical health is the same.  Compared to one year ago, the patient feels his mental health is the same.    Objective     Physical Exam  Vitals reviewed.   Constitutional:       Appearance: Normal appearance.   HENT:      Head: Normocephalic and atraumatic.      Right Ear: Tympanic membrane, ear canal and external ear normal. There is no impacted cerumen.      Left Ear: Tympanic membrane, ear canal and external ear normal. There is no impacted cerumen.      Nose: Nose normal. No congestion.      Mouth/Throat:      Mouth: Mucous membranes are moist.      Pharynx: Oropharynx is clear.   Eyes:      Extraocular Movements: Extraocular movements intact.      Conjunctiva/sclera: Conjunctivae normal.   Cardiovascular:      Rate and Rhythm: Normal rate and regular rhythm.      Heart sounds: Normal heart sounds. No murmur heard.  Pulmonary:      Effort: Pulmonary effort is normal.      Breath sounds: Normal breath sounds.   Abdominal:      General: There is no distension.      Palpations: Abdomen is soft. There is no mass.      Tenderness: There is no abdominal tenderness.   Musculoskeletal:      Cervical back: Neck supple. No tenderness.      Right lower leg: No edema.      Left lower leg: No edema.      Comments: Left hand: Significant tenderness of the left hand at the base of CMC.  Right knee: No joint effusion.  Tenderness to palpation along the medial collateral ligament.  No significant tenderness of the joint space on the medial or lateral side.  No pain with valgus stress, some pain on the medial side with varus stress.  Good ROM.  Right hip: Good ROM, no tenderness on internal or external rotation on 90 degree flexion of the hip.   Lymphadenopathy:      Cervical: No cervical adenopathy.   Skin:     General: Skin is warm and dry.  "  Neurological:      Mental Status: He is alert and oriented to person, place, and time. Mental status is at baseline.   Psychiatric:         Behavior: Behavior normal.         Thought Content: Thought content normal.          Procedures     Vitals:    03/29/24 0804   BP: 122/82   BP Location: Left arm   Patient Position: Sitting   Cuff Size: Adult   Pulse: 82   Temp: 97.8 °F (36.6 °C)   TempSrc: Temporal   Weight: 86.1 kg (189 lb 12.8 oz)   Height: 179.5 cm (70.67\")       BMI is >= 25 and <30. (Overweight) The following options were offered after discussion;: exercise counseling/recommendations      Assessment & Plan     1. Encounter for subsequent annual wellness visit (AWV) in Medicare patient    2. Chronic pain of left thumb  Suspect arthritis.  Will do x-ray and refer for hand surgeon.  They might be able to give injections for this.  - Ambulatory Referral to Hand Surgery  - XR Hand 3+ View Left; Future    3. Chronic pain of right knee  Cannot rule out meniscal tear, however suspect on the basis of osteoarthritis.  Arthroscopy and workup for meniscal tear is somewhat controversial at his age, especially if there is arthritis.  He would need to see an orthopedic specialist to discuss this more in detail.  I also recommended physical therapy, but he wants to hold off for now, he has done exercises in the past and will work on this on his own.  He will get back in touch with me if he wants referral to orthopedic specialist.  - XR Knee 1 or 2 View Right;    4. Essential hypertension  BP Readings from Last 3 Encounters:   03/29/24 122/82   04/21/23 138/70   03/27/23 120/82   Blood pressure at goal and well-controlled.  Continue on current medication  - lisinopril (PRINIVIL,ZESTRIL) 10 MG tablet; Take 1 tablet by mouth Daily.  Dispense: 90 tablet; Refill: 3    5. Mixed hyperlipidemia  Normal lipids, continue on atorvastatin 20  - atorvastatin (LIPITOR) 20 MG tablet; Take 1 tablet by mouth Daily. Stop Zocor  Dispense: " 90 tablet; Refill: 3    6. Prediabetes  Hemoglobin A1C   Date Value Ref Range Status   03/27/2024 5.50 4.80 - 5.60 % Final   03/27/2023 5.50 4.80 - 5.60 % Final   03/16/2022 5.70 (H) 4.80 - 5.60 % Final   Currently blood sugar in normal range.  Will continue to monitor    7. Mild persistent asthma without complication  Stable and well-controlled  - albuterol sulfate  (90 Base) MCG/ACT inhaler; Inhale 1 puff Every 4 (Four) Hours As Needed for Wheezing.  Dispense: 54 g; Refill: 3  - budesonide-formoterol (SYMBICORT) 80-4.5 MCG/ACT inhaler; Inhale 2 puffs 2 (Two) Times a Day.  Dispense: 30.6 g; Refill: 3    8. Allergic rhinitis, unspecified seasonality, unspecified trigger  Stable and well-controlled  - fluticasone (FLONASE) 50 MCG/ACT nasal spray; Spray 2 sprays every day by intranasal route as directed  Dispense: 48 g; Refill: 3    9. Rectal bleeding  10. Change in stool habits  11. Colon cancer screening  Patient has last colonoscopy in 2017.  Will refer back to Dr. Beal who did his colonoscopy.  I discussed option for consultation first, with possibility for right otoscopy to evaluate for hemorrhoids.  At this point patient would like to proceed with full colonoscopy.  - CBC (No Diff); Future  - Ambulatory Referral For Screening Colonoscopy    12. Overweight (BMI 25.0-29.9)    Patient Self-Management and Personalized Health Advice  The patient has been provided with information about: exercise and preventive services including:   Annual Wellness Visit (AWV).    Outpatient Encounter Medications as of 3/29/2024   Medication Sig Dispense Refill    acetaminophen (TYLENOL) 500 MG tablet Take  by mouth As Needed for Mild Pain .      albuterol sulfate  (90 Base) MCG/ACT inhaler Inhale 1 puff Every 4 (Four) Hours As Needed for Wheezing. 54 g 3    Apoaequorin (PREVAGEN PO) Take  by mouth Daily.      atorvastatin (LIPITOR) 20 MG tablet Take 1 tablet by mouth Daily. Stop Zocor 90 tablet 3     Boswellia-Glucosamine-Vit D (OSTEO BI-FLEX ONE PER DAY PO) Osteo Bi-Flex   Daily      budesonide-formoterol (SYMBICORT) 80-4.5 MCG/ACT inhaler Inhale 2 puffs 2 (Two) Times a Day. 30.6 g 3    Calcium Carbonate Antacid (ANTACID PO) Take  by mouth As Needed.      cetirizine (zyrTEC) 10 MG tablet Take 1 tablet by mouth Daily. Alternates with allegra      Cholecalciferol 25 MCG (1000 UT) capsule Vitamin D3 25 mcg (1,000 unit) capsule   Take 1 capsule every day by oral route.      famotidine (PEPCID) 20 MG tablet Take 1 tablet by mouth Daily.      fexofenadine (ALLEGRA) 180 MG tablet Take 1 tablet by mouth Daily. Alternates with Cetirizine      fluticasone (FLONASE) 50 MCG/ACT nasal spray Spray 2 sprays every day by intranasal route as directed 48 g 3    guaiFENesin (MUCINEX) 600 MG 12 hr tablet As Needed.      ibuprofen (ADVIL,MOTRIN) 200 MG tablet As Needed.      Krill Oil 300 MG capsule Daily.      lisinopril (PRINIVIL,ZESTRIL) 10 MG tablet Take 1 tablet by mouth Daily. 90 tablet 3    montelukast (SINGULAIR) 10 MG tablet Take 1 tablet by mouth Every Night. 90 tablet 3    multivitamin with minerals tablet tablet Take 1 tablet by mouth Daily.      Probiotic Product (PROBIOTIC DAILY PO) Daily.      pseudoephedrine (SUDAFED) 30 MG tablet Take 1 tablet by mouth Every 4 (Four) Hours As Needed for Congestion. Should be avoided because of blood pressure. 20 tablet 0    tiZANidine (ZANAFLEX) 4 MG tablet Take 1 tablet by mouth Daily As Needed for Muscle Spasms. 90 tablet 3    vitamin B-12 (CYANOCOBALAMIN) 500 MCG tablet Take 1 tablet by mouth Daily.      [DISCONTINUED] albuterol sulfate  (90 Base) MCG/ACT inhaler Inhale 1 puff Every 4 (Four) Hours As Needed for Wheezing. 54 g 3    [DISCONTINUED] atorvastatin (LIPITOR) 20 MG tablet Take 1 tablet by mouth Daily. Stop Zocor 90 tablet 3    [DISCONTINUED] azithromycin (Zithromax Z-Adarsh) 250 MG tablet Take 2 tablets by mouth on day 1, then 1 tablet daily on days 2-5 6 tablet 0     [DISCONTINUED] budesonide-formoterol (SYMBICORT) 80-4.5 MCG/ACT inhaler Inhale 2 puffs 2 (Two) Times a Day. 30.6 g 3    [DISCONTINUED] fluticasone (FLONASE) 50 MCG/ACT nasal spray Spray 2 sprays every day by intranasal route as directed 48 g 3    [DISCONTINUED] lisinopril (PRINIVIL,ZESTRIL) 10 MG tablet Take 1 tablet by mouth Daily. 90 tablet 3    [DISCONTINUED] montelukast (SINGULAIR) 10 MG tablet Take 1 tablet by mouth Every Night. 90 tablet 1    [DISCONTINUED] predniSONE (DELTASONE) 10 MG tablet 60 mg day 1-2, 20 mg day 3-4, 10 mg day 5-6 18 tablet 0    [DISCONTINUED] promethazine-dextromethorphan (PROMETHAZINE-DM) 6.25-15 MG/5ML syrup Take 5 mL by mouth 4 (Four) Times a Day As Needed for Cough. 240 mL 0    [DISCONTINUED] tiZANidine (ZANAFLEX) 4 MG tablet Take 1 tablet by mouth Daily As Needed for Muscle Spasms. 90 tablet 1    [DISCONTINUED] Biotin 1000 MCG tablet Take 1,000 mcg by mouth Daily. (Patient not taking: Reported on 3/29/2024)      [DISCONTINUED] Folic Acid (FOLATE PO) Take  by mouth Daily. (Patient not taking: Reported on 3/29/2024)       No facility-administered encounter medications on file as of 3/29/2024.       Reviewed use of high risk medication in the elderly: yes  Reviewed for potential of harmful drug interactions in the elderly: yes    Follow Up:  Return in about 1 year (around 3/29/2025), or if symptoms worsen or fail to improve, for Medicare Wellness.     Future Appointments         Provider Department Center    3/31/2025 8:00 AM Juan Carlos Paredes MD [facility] INTERNAL MEDICINE [facility]              There are no Patient Instructions on file for this visit.    An After Visit Summary and PPPS with all of these plans were given to the patient.      Juan Carlos Paredes MD

## 2024-03-29 NOTE — PROGRESS NOTES
I spoke to Geraldine at the lab she stated he was there today and he wanted it added but they couldn't do it so they went ahead and got it while he was there.

## 2024-04-05 ENCOUNTER — PATIENT MESSAGE (OUTPATIENT)
Dept: INTERNAL MEDICINE | Facility: CLINIC | Age: 69
End: 2024-04-05
Payer: COMMERCIAL

## 2024-04-05 NOTE — TELEPHONE ENCOUNTER
From: Gabino Gannon  To: Juan Carlos Paredes  Sent: 4/5/2024 9:58 AM EDT  Subject: Hand appointment    Doctor Lena,    Per your referral, I have scheduled an appointment at [facility] on April 17th at 9:00 AM for my thumb joint pain. The  was not sure if I would need to bring the X-Rays with me, or whether they could access them online. Could someone find out and let me know if I need to take any action before my appointment?     Thank you.

## 2024-08-17 ENCOUNTER — PATIENT ROUNDING (BHMG ONLY) (OUTPATIENT)
Dept: URGENT CARE | Facility: CLINIC | Age: 69
End: 2024-08-17
Payer: COMMERCIAL

## 2024-09-03 ENCOUNTER — OFFICE VISIT (OUTPATIENT)
Dept: ORTHOPEDIC SURGERY | Facility: CLINIC | Age: 69
End: 2024-09-03
Payer: MEDICARE

## 2024-09-03 VITALS — HEIGHT: 70 IN | WEIGHT: 186 LBS | BODY MASS INDEX: 26.63 KG/M2

## 2024-09-03 DIAGNOSIS — S89.91XA INJURY OF RIGHT KNEE, INITIAL ENCOUNTER: ICD-10-CM

## 2024-09-03 DIAGNOSIS — M17.11 PRIMARY OSTEOARTHRITIS OF RIGHT KNEE: Primary | ICD-10-CM

## 2024-09-03 DIAGNOSIS — M25.561 RIGHT KNEE PAIN, UNSPECIFIED CHRONICITY: ICD-10-CM

## 2024-09-03 PROCEDURE — 99204 OFFICE O/P NEW MOD 45 MIN: CPT | Performed by: ORTHOPAEDIC SURGERY

## 2024-09-03 NOTE — PROGRESS NOTES
[facility] [place] [facility]        Subjective     Pain of the Right Knee      HPI    Gabino Gannon is a 69 y.o. male.  Patient is here today for new problem today regarding his right knee.  He says this has bothered him on and off for the last 10 years or so.  He has been to physical therapy in the past which has helped him but never alleviated the issue.  He says on 8/15/2024, he was jumping in the neighborhood pool with his 5-year-old grandson and his right foot got caught and this forced him into a valgus moment in the right knee.  Since that time he has had difficulty with twisting.  Going straight forward is fine but any type of lateral movement or twisting behavior bothersome.        Past Medical History:   Diagnosis Date    Allergic     Penicillin    Arthritis     Asthma 1966    BCC (basal cell carcinoma), ear, left     Essential hypertension 2021    Low back pain     Mixed hyperlipidemia 2017    Prediabetes 2022      Past Surgical History:   Procedure Laterality Date    APPENDECTOMY  1970    BASAL CELL CARCINOMA EXCISION      COLONOSCOPY      HERNIA REPAIR      TONSILLECTOMY  1970    VASECTOMY  1985      Family History   Problem Relation Age of Onset    Breast cancer Mother         BRCA pos. Patient tested neg    Heart attack Father      Social History     Socioeconomic History    Marital status:    Tobacco Use    Smoking status: Former     Current packs/day: 0.00     Average packs/day: 2.0 packs/day for 15.0 years (30.0 ttl pk-yrs)     Types: Cigarettes     Start date: 1969     Quit date: 1984     Years since quittin.7    Smokeless tobacco: Never   Vaping Use    Vaping status: Never Used   Substance and Sexual Activity    Alcohol use: Yes     Comment: 10 weekly    Drug use: Never    Sexual activity: Defer      Current Outpatient Medications on File Prior to Visit   Medication Sig Dispense Refill    acetaminophen (TYLENOL) 500 MG tablet Take   by mouth As Needed for Mild Pain .      albuterol sulfate  (90 Base) MCG/ACT inhaler Inhale 1 puff Every 4 (Four) Hours As Needed for Wheezing. 54 g 3    Apoaequorin (PREVAGEN PO) Take  by mouth Daily.      atorvastatin (LIPITOR) 20 MG tablet Take 1 tablet by mouth Daily. Stop Zocor 90 tablet 3    Boswellia-Glucosamine-Vit D (OSTEO BI-FLEX ONE PER DAY PO) Osteo Bi-Flex   Daily      budesonide-formoterol (SYMBICORT) 80-4.5 MCG/ACT inhaler Inhale 2 puffs 2 (Two) Times a Day. 30.6 g 3    Calcium Carbonate Antacid (ANTACID PO) Take  by mouth As Needed.      cetirizine (zyrTEC) 10 MG tablet Take 1 tablet by mouth Daily. Alternates with allegra      Cholecalciferol 25 MCG (1000 UT) capsule Vitamin D3 25 mcg (1,000 unit) capsule   Take 1 capsule every day by oral route.      diclofenac (VOLTAREN) 75 MG EC tablet Take 1 tablet by mouth 2 (Two) Times a Day. 30 tablet 0    famotidine (PEPCID) 20 MG tablet Take 1 tablet by mouth Daily.      fexofenadine (ALLEGRA) 180 MG tablet Take 1 tablet by mouth Daily. Alternates with Cetirizine      fluticasone (FLONASE) 50 MCG/ACT nasal spray Spray 2 sprays every day by intranasal route as directed 48 g 3    guaiFENesin (MUCINEX) 600 MG 12 hr tablet As Needed.      ibuprofen (ADVIL,MOTRIN) 200 MG tablet As Needed.      Krill Oil 300 MG capsule Daily.      lisinopril (PRINIVIL,ZESTRIL) 10 MG tablet Take 1 tablet by mouth Daily. 90 tablet 3    montelukast (SINGULAIR) 10 MG tablet Take 1 tablet by mouth Every Night. 90 tablet 3    multivitamin with minerals tablet tablet Take 1 tablet by mouth Daily.      Probiotic Product (PROBIOTIC DAILY PO) Daily.      pseudoephedrine (SUDAFED) 30 MG tablet Take 1 tablet by mouth Every 4 (Four) Hours As Needed for Congestion. Should be avoided because of blood pressure. 20 tablet 0    tiZANidine (ZANAFLEX) 4 MG tablet Take 1 tablet by mouth Daily As Needed for Muscle Spasms. 90 tablet 3    vitamin B-12 (CYANOCOBALAMIN) 500 MCG tablet Take 1 tablet  "by mouth Daily.       No current facility-administered medications on file prior to visit.      Allergies   Allergen Reactions    Penicillins Hives          Review of Systems   Constitutional: Negative.    HENT: Negative.     Eyes: Negative.    Respiratory: Negative.     Cardiovascular: Negative.    Gastrointestinal: Negative.    Endocrine: Negative.    Genitourinary: Negative.    Musculoskeletal:  Positive for arthralgias.   Skin: Negative.    Allergic/Immunologic: Negative.    Neurological: Negative.    Hematological: Negative.    Psychiatric/Behavioral: Negative.          I reviewed the patient's chief complaint, history of present illness, review of systems, past medical history, surgical history, family history, social history, medications and allergy list.        Objective      Physical Exam  Ht 177.8 cm (70\")   Wt 84.4 kg (186 lb)   BMI 26.69 kg/m²     Body mass index is 26.69 kg/m².    General  Mental Status - alert  General Appearance - cooperative, well groomed, not in acute distress  Orientation - Oriented X3  Build & Nutrition - well developed and well nourished  Posture - normal posture  Gait - normal gait       Ortho Exam      Musculoskeletal:  Global Assessment:  Overall assessment of Lower Extremity Muscle Strength and Tone:    Right quadriceps--5/5  Right hamstrings--5/5  Right tibialis anterior--5/5  Right gastroc soleus--5/5  Right EHL--5/5    Lower Extremity:    Right knee:  Tenderness:  Over medial joint line  Effusion:  1+  Crepitus:  none  Pulses:  2+  Ecchymosis:  None  Warmth:  None     ROM:  Right:  Extension:0    Flexion:130    Instability:      Right:  Lachman Test:  Negative  Varus stress test negative  Valgus stress test negative   Anterior Drawer Test:  Negative  Posterior Drawer Test:  Negative    Functional Testing:  Right:  Jean Marie's test:  Positive  Patella grind test:  Negative  Q-angle:  Normal  Apprehension Sign:  Negative      Imaging/Studies Reviewed and " Interpreted:  Imaging Results (Last 24 Hours)       ** No results found for the last 24 hours. **          XR Ankle 3+ View Right  Narrative: XR KNEE 4+ VW RIGHT, XR ANKLE 3+ VW RIGHT    Date of Exam: 8/16/2024 11:31 AM EDT    Indication: pain and swelling after trauma    Comparison: None available.    Findings:  Right knee: 3 views. Joint compartments are maintained and are normally aligned. There is no fracture or dislocation. There are no degenerative changes.    Right ankle: 3 views. The ankle joint is maintained and normally aligned. There is no fracture or dislocation. There are no degenerative changes.  Impression: Impression:  Negative exams.    Electronically Signed: Munira Mcneil MD    8/16/2024 11:55 AM EDT    Workstation ID: [id number]  XR Knee 4+ View Right  Narrative: XR KNEE 4+ VW RIGHT, XR ANKLE 3+ VW RIGHT    Date of Exam: 8/16/2024 11:31 AM EDT    Indication: pain and swelling after trauma    Comparison: None available.    Findings:  Right knee: 3 views. Joint compartments are maintained and are normally aligned. There is no fracture or dislocation. There are no degenerative changes.    Right ankle: 3 views. The ankle joint is maintained and normally aligned. There is no fracture or dislocation. There are no degenerative changes.  Impression: Impression:  Negative exams.    Electronically Signed: Munira Mcneil MD    8/16/2024 11:55 AM EDT    Workstation ID: [id number]      We have reviewed and interpreted the x-ray of the patient's right knee above.  No significant bony abnormalities are noted.  Not a lot of arthritis is noted but these are nonweightbearing films.      Assessment    Assessment:  1. Primary osteoarthritis of right knee    2. Right knee pain, unspecified chronicity    3. Injury of right knee, initial encounter        Plan:  Continue over-the-counter medication as needed for discomfort  Underlying mild osteoarthritis of the knee with a recent right knee injury--MRI of the patient's  right knee will be requested looking for medial meniscal pathology.  Will see him back to review that study.        Terrence Salazar MD  09/03/24  16:57 EDT      Dictated Utilizing Dragon Dictation.

## 2024-09-24 ENCOUNTER — HOSPITAL ENCOUNTER (OUTPATIENT)
Dept: MRI IMAGING | Facility: HOSPITAL | Age: 69
Discharge: HOME | End: 2024-09-24
Admitting: ORTHOPAEDIC SURGERY
Payer: MEDICARE

## 2024-09-24 DIAGNOSIS — S89.91XA INJURY OF RIGHT KNEE, INITIAL ENCOUNTER: ICD-10-CM

## 2024-09-24 PROCEDURE — 73721 MRI JNT OF LWR EXTRE W/O DYE: CPT

## 2024-10-01 ENCOUNTER — OFFICE VISIT (OUTPATIENT)
Dept: ORTHOPEDIC SURGERY | Facility: CLINIC | Age: 69
End: 2024-10-01
Payer: MEDICARE

## 2024-10-01 VITALS
SYSTOLIC BLOOD PRESSURE: 138 MMHG | WEIGHT: 186 LBS | HEIGHT: 70 IN | DIASTOLIC BLOOD PRESSURE: 82 MMHG | BODY MASS INDEX: 26.63 KG/M2

## 2024-10-01 DIAGNOSIS — M85.651 BONE CYST OF RIGHT FEMUR: ICD-10-CM

## 2024-10-01 DIAGNOSIS — M17.11 PRIMARY OSTEOARTHRITIS OF RIGHT KNEE: Primary | ICD-10-CM

## 2024-10-01 DIAGNOSIS — M25.561 RIGHT KNEE PAIN, UNSPECIFIED CHRONICITY: ICD-10-CM

## 2024-10-01 DIAGNOSIS — S89.91XD INJURY OF RIGHT KNEE, SUBSEQUENT ENCOUNTER: ICD-10-CM

## 2024-10-01 DIAGNOSIS — S83.411D SPRAIN OF MEDIAL COLLATERAL LIGAMENT OF RIGHT KNEE, SUBSEQUENT ENCOUNTER: ICD-10-CM

## 2024-10-01 NOTE — PROGRESS NOTES
"    [facility] [place] [facility] Note        Subjective     CC: Follow-up (1 mth f/u - Primary osteoarthritis of right knee, MRI in Epic )      JORDY Gannon is a 69 y.o. male.  Patient returns for follow-up of the MRI of his right knee.  He says he has difficulty squatting and with lateral movement and twisting.  Walking seems to be okay.  He cut up a downed tree recently and the seem to irritate the knee.  He says overall he is better than he was when he presented on 9/3/2024.    Overall, patient's symptoms are as above    ROS:    Constiutional:Pt denies fever, chills, nausea, or vomiting.  MSK:as above        Objective      Past Medical History  Past Medical History:   Diagnosis Date    Allergic     Penicillin    Arthritis     Asthma 1966    BCC (basal cell carcinoma), ear, left     Essential hypertension 2021    Low back pain     Low back strain     Lumbosacral disc disease     L5-S1    Mixed hyperlipidemia 2017    Prediabetes 2022    Tear of meniscus of knee 25    Per Urgent Treatment Dr. Black Doshi elbow 2005    Recurring tendinitis     Social History     Socioeconomic History    Marital status:    Tobacco Use    Smoking status: Former     Current packs/day: 0.00     Average packs/day: 2.0 packs/day for 15.0 years (30.0 ttl pk-yrs)     Types: Cigarettes     Start date: 1969     Quit date: 1984     Years since quittin.7    Smokeless tobacco: Never   Vaping Use    Vaping status: Never Used   Substance and Sexual Activity    Alcohol use: Yes     Comment: 10 weekly    Drug use: Never    Sexual activity: Not Currently          Physical Exam  /82 (BP Location: Left arm, Patient Position: Sitting, Cuff Size: Adult)   Ht 177.8 cm (70\")   Wt 84.4 kg (186 lb)   BMI 26.69 kg/m²     Body mass index is 26.69 kg/m².    Patient is well nourished and well developed.        Ortho Exam      Musculoskeletal:  Global Assessment:  Overall " assessment of Lower Extremity Muscle Strength and Tone:    Right quadriceps--5/5  Right hamstrings--5/5  Right tibialis anterior--5/5  Right gastroc soleus--5/5  Right EHL--5/5    Lower Extremity:    Right knee:  Tenderness:  Over medial joint line.  Effusion:  1+  Crepitus:  none  Pulses:  2+  Ecchymosis:  None  Warmth:  None     ROM:  Right:  Extension:0    Flexion:130    Instability:      Right:  Lachman Test:  Negative  Varus stress test negative  Valgus stress test positive but patient has good endpoints at 0 and 30 degrees   Anterior Drawer Test:  Negative  Posterior Drawer Test:  Negative    Functional Testing:  Right:  Jean Marie's test:  Positive  Patella grind test:  Negative  Q-angle:  Normal  Apprehension Sign:  Negative      Imaging/Labs/EMG Reviewed and Interpreted:  Imaging Results (Last 24 Hours)       ** No results found for the last 24 hours. **          MRI Knee Right Without Contrast  Narrative: MRI KNEE RIGHT  WO CONTRAST    Date of Exam: 9/24/2024 12:28 PM EDT    Indication: pain. Medial side pain     Comparison: Knee x-ray 8/16/2024    Technique:  Routine multiplanar/multisequence images of the right knee were obtained without contrast administration.    Findings:  Osseous Structures and Intra-Articular  Bone marrow signal intensity is normal. No osseous contusions or fractures. No significant joint effusion. No intra-articular loose bodies. Benign-appearing chondroid focus in the medial femoral condyle. This measures approximately 3.7 x 1.7 x 2.7 cm.   Small subchondral cyst at the posterior lateral femoral condyle.    Ligaments  The anterior cruciate ligament and posterior cruciate ligaments are intact. Medial collateral ligament and lateral collateral ligament complex are intact. The medial collateral ligament is thickened. This is likely due to remote injury.    Menisci  No meniscal tears.    Extensor compartment  Patella has anatomic position. Extensor mechanism is  intact.    Cartilage  Degenerative medial compartment. There is a full-thickness cartilage defect of the medial femoral condyle laterally measuring about 7 mm transverse dimension. There is full-thickness cartilage defect of the patella at the medial facet measuring about 8   mm. There is cartilage signal alteration patellofemoral compartment.. Full-thickness defect of cartilage at the posterior lateral femoral condyle with subchondral cystic change. Cartilage defect measures maximally 4 mm.    Soft tissues  No soft tissue masses. Tiny An cyst out evidence of recent Baker cyst rupture.    Miscellaneous  Iliotibial band is normal. Popliteus muscle and tendon are normal in appearance.  Impression: Impression:  1.There is mild medial compartment joint space narrowing with full-thickness cartilage defect of the lateral portion medial femoral condyle.  2.There are findings of remote injury medial collateral ligament.  3.There is mild to moderate chondromalacia patellofemoral compartment.  4.Tiny An cyst.    Electronically Signed: Anuja Guerrero MD    9/25/2024 9:43 AM EDT    Workstation ID: [id number]      We have reviewed and interpreted the MRI above.  Patient has a chondroid benign-appearing lesion within the medial femoral condyle.  Small benign-appearing cyst in the posterior lateral femoral condyle.  There is edema throughout the MCL especially on the femoral origin.  No meniscal pathology is noted.      Assessment    Assessment:  1. Primary osteoarthritis of right knee    2. Right knee pain, unspecified chronicity    3. Injury of right knee, subsequent encounter    4. Bone cyst of right femur    5. Sprain of medial collateral ligament of right knee, subsequent encounter        Plan:  Recommend over the counter anti-inflammatories for pain and/or swelling  Right knee injury with MCL sprain--we have encouraged the patient to stay as active as possible and to continue to strengthen his lower extremity.  He should  avoid twisting activities and also side-to-side activities for now.  Check him back in a couple months to make sure he is getting better not worse and if he is still not where he needs to be, consider the hinged knee sleeve with physical therapy but hopefully that will not be necessary  Benign-appearing chondroid lesion medial femoral condyle--observe for now      Terrence Salazar MD  10/01/24  16:00 EDT      Dictated Utilizing Dragon Dictation.

## 2024-11-05 PROBLEM — K63.5 COLON POLYPS: Status: ACTIVE | Noted: 2024-11-05

## 2024-11-14 ENCOUNTER — OFFICE VISIT (OUTPATIENT)
Dept: ORTHOPEDIC SURGERY | Facility: CLINIC | Age: 69
End: 2024-11-14
Payer: MEDICARE

## 2024-11-14 VITALS
HEIGHT: 70 IN | DIASTOLIC BLOOD PRESSURE: 78 MMHG | SYSTOLIC BLOOD PRESSURE: 130 MMHG | WEIGHT: 197.6 LBS | BODY MASS INDEX: 28.29 KG/M2

## 2024-11-14 DIAGNOSIS — S89.91XD INJURY OF RIGHT KNEE, SUBSEQUENT ENCOUNTER: ICD-10-CM

## 2024-11-14 DIAGNOSIS — M17.11 PRIMARY OSTEOARTHRITIS OF RIGHT KNEE: Primary | ICD-10-CM

## 2024-11-14 DIAGNOSIS — M25.561 RIGHT KNEE PAIN, UNSPECIFIED CHRONICITY: ICD-10-CM

## 2024-11-14 DIAGNOSIS — S83.411D SPRAIN OF MEDIAL COLLATERAL LIGAMENT OF RIGHT KNEE, SUBSEQUENT ENCOUNTER: ICD-10-CM

## 2024-11-14 DIAGNOSIS — M85.651 BONE CYST OF RIGHT FEMUR: ICD-10-CM

## 2024-11-14 NOTE — PROGRESS NOTES
"    [facility] [place] [facility] Note        Subjective     CC: Follow-up (6 WEEK FOLLOW UP -/Primary osteoarthritis of right knee //)      HPI    Gabino Gannon is a 69 y.o. male.  Patient is here today for follow-up of his right knee injury.  He has a small OCD on the lateral aspect of the medial femoral condyle.  Remote injury to the MCL.  He says he still has trouble twisting.  Has medial joint line tenderness.  MRI did not show obvious meniscal pathology.  He is able to run and lewis his grandchildren but still has to be careful after long periods of activity.    Overall, patient's symptoms are as above    ROS:    Constiutional:Pt denies fever, chills, nausea, or vomiting.  MSK:as above        Objective      Past Medical History  Past Medical History:   Diagnosis Date    Allergic     Penicillin    Arthritis     Asthma 1966    BCC (basal cell carcinoma), ear, left     Essential hypertension 2021    Low back pain     Low back strain     Lumbosacral disc disease     L5-S1    Mixed hyperlipidemia 2017    Prediabetes 2022    Tear of meniscus of knee 25    Per Urgent Treatment Dr. Black Doshi elbow 2005    Recurring tendinitis     Social History     Socioeconomic History    Marital status:    Tobacco Use    Smoking status: Former     Current packs/day: 0.00     Average packs/day: 2.0 packs/day for 15.0 years (30.0 ttl pk-yrs)     Types: Cigarettes     Start date: 1969     Quit date: 1984     Years since quittin.8    Smokeless tobacco: Never   Vaping Use    Vaping status: Never Used   Substance and Sexual Activity    Alcohol use: Yes     Comment: 10 weekly    Drug use: Never    Sexual activity: Not Currently          Physical Exam  /78   Ht 177.8 cm (70\")   Wt 89.6 kg (197 lb 9.6 oz)   BMI 28.35 kg/m²     Body mass index is 28.35 kg/m².    Patient is well nourished and well developed.        Ortho Exam      Musculoskeletal:  Global Assessment:  " Overall assessment of Lower Extremity Muscle Strength and Tone:    Right quadriceps--5/5  Right hamstrings--5/5  Right tibialis anterior--5/5  Right gastroc soleus--5/5  Right EHL--5/5    Lower Extremity:    Right knee:  Tenderness:  Over medial joint line  Effusion:  1+  Crepitus:  none  Pulses:  2+  Ecchymosis:  None  Warmth:  None     ROM:  Right:  Extension:0    Flexion:130    Instability:      Right:  Lachman Test:  Negative  Varus stress test negative  Valgus stress test negative   Anterior Drawer Test:  Negative  Posterior Drawer Test:  Negative    Functional Testing:  Right:  Jean Marie's test:  Positive  Patella grind test:  Negative  Q-angle:  Normal  Apprehension Sign:  Negative      Imaging/Labs/EMG Reviewed and Interpreted:  Imaging Results (Last 24 Hours)       ** No results found for the last 24 hours. **              Assessment    Assessment:  1. Primary osteoarthritis of right knee    2. Right knee pain, unspecified chronicity    3. Injury of right knee, subsequent encounter    4. Bone cyst of right femur    5. Sprain of medial collateral ligament of right knee, subsequent encounter        Plan:  Recommend over the counter anti-inflammatories for pain and/or swelling  Right knee pain with right knee injury, chondroid appearing lesion within the medial femoral condyle, benign-appearing bone cyst posterior lateral femoral condyle and MCL sprain--we have told the patient that there are times where the MRI is imperfect and does not  on subtle meniscal pathology.  This very well may be the case given his constellation of symptoms.  I have told him that if he continues to be symptomatic or worsens he should call back and we can consider diagnostic scope of the knee.  Otherwise we will let him go today.  He will follow-up as needed.      Terrence Salazar MD  11/14/24  09:18 EST      Dictated Utilizing Dragon Dictation.

## 2025-03-31 ENCOUNTER — LAB (OUTPATIENT)
Dept: LAB | Facility: HOSPITAL | Age: 70
End: 2025-03-31
Payer: MEDICARE

## 2025-03-31 ENCOUNTER — OFFICE VISIT (OUTPATIENT)
Dept: INTERNAL MEDICINE | Facility: CLINIC | Age: 70
End: 2025-03-31
Payer: MEDICARE

## 2025-03-31 VITALS
SYSTOLIC BLOOD PRESSURE: 126 MMHG | BODY MASS INDEX: 27.94 KG/M2 | HEART RATE: 72 BPM | TEMPERATURE: 98.4 F | HEIGHT: 70 IN | DIASTOLIC BLOOD PRESSURE: 80 MMHG | WEIGHT: 195.2 LBS

## 2025-03-31 DIAGNOSIS — I10 ESSENTIAL HYPERTENSION: Chronic | ICD-10-CM

## 2025-03-31 DIAGNOSIS — R73.03 PREDIABETES: Chronic | ICD-10-CM

## 2025-03-31 DIAGNOSIS — D12.2 ADENOMATOUS POLYP OF ASCENDING COLON: ICD-10-CM

## 2025-03-31 DIAGNOSIS — J30.9 ALLERGIC RHINITIS, UNSPECIFIED SEASONALITY, UNSPECIFIED TRIGGER: Chronic | ICD-10-CM

## 2025-03-31 DIAGNOSIS — S83.411D SPRAIN OF MEDIAL COLLATERAL LIGAMENT OF RIGHT KNEE, SUBSEQUENT ENCOUNTER: ICD-10-CM

## 2025-03-31 DIAGNOSIS — Z13.21 ENCOUNTER FOR VITAMIN DEFICIENCY SCREENING: ICD-10-CM

## 2025-03-31 DIAGNOSIS — Z00.00 ENCOUNTER FOR SUBSEQUENT ANNUAL WELLNESS VISIT (AWV) IN MEDICARE PATIENT: Primary | ICD-10-CM

## 2025-03-31 DIAGNOSIS — Z12.5 SCREENING PSA (PROSTATE SPECIFIC ANTIGEN): ICD-10-CM

## 2025-03-31 DIAGNOSIS — J45.30 MILD PERSISTENT ASTHMA WITHOUT COMPLICATION: Chronic | ICD-10-CM

## 2025-03-31 DIAGNOSIS — E78.2 MIXED HYPERLIPIDEMIA: Chronic | ICD-10-CM

## 2025-03-31 DIAGNOSIS — E66.3 OVERWEIGHT (BMI 25.0-29.9): ICD-10-CM

## 2025-03-31 PROBLEM — S83.411A SPRAIN OF MEDIAL COLLATERAL LIGAMENT OF RIGHT KNEE: Status: ACTIVE | Noted: 2025-03-31

## 2025-03-31 LAB
ALBUMIN SERPL-MCNC: 4.2 G/DL (ref 3.5–5.2)
ALBUMIN/GLOB SERPL: 1.9 G/DL
ALP SERPL-CCNC: 53 U/L (ref 39–117)
ALT SERPL W P-5'-P-CCNC: 21 U/L (ref 1–41)
ANION GAP SERPL CALCULATED.3IONS-SCNC: 11.7 MMOL/L (ref 5–15)
AST SERPL-CCNC: 33 U/L (ref 1–40)
BILIRUB SERPL-MCNC: 1.3 MG/DL (ref 0–1.2)
BUN SERPL-MCNC: 12 MG/DL (ref 8–23)
BUN/CREAT SERPL: 9.8 (ref 7–25)
CALCIUM SPEC-SCNC: 9.5 MG/DL (ref 8.6–10.5)
CHLORIDE SERPL-SCNC: 103 MMOL/L (ref 98–107)
CHOLEST SERPL-MCNC: 188 MG/DL (ref 0–200)
CO2 SERPL-SCNC: 25.3 MMOL/L (ref 22–29)
CREAT SERPL-MCNC: 1.22 MG/DL (ref 0.76–1.27)
DEPRECATED RDW RBC AUTO: 44.6 FL (ref 37–54)
EGFRCR SERPLBLD CKD-EPI 2021: 64.6 ML/MIN/1.73
ERYTHROCYTE [DISTWIDTH] IN BLOOD BY AUTOMATED COUNT: 14.7 % (ref 12.3–15.4)
EXPIRATION DATE: NORMAL
GLOBULIN UR ELPH-MCNC: 2.2 GM/DL
GLUCOSE SERPL-MCNC: 94 MG/DL (ref 65–99)
HBA1C MFR BLD: 5.5 % (ref 4.5–5.7)
HCT VFR BLD AUTO: 42 % (ref 37.5–51)
HDLC SERPL-MCNC: 76 MG/DL (ref 40–60)
HGB BLD-MCNC: 13.4 G/DL (ref 13–17.7)
LDLC SERPL CALC-MCNC: 90 MG/DL (ref 0–100)
LDLC/HDLC SERPL: 1.14 {RATIO}
Lab: NORMAL
MCH RBC QN AUTO: 26.8 PG (ref 26.6–33)
MCHC RBC AUTO-ENTMCNC: 31.9 G/DL (ref 31.5–35.7)
MCV RBC AUTO: 84 FL (ref 79–97)
PLATELET # BLD AUTO: 266 10*3/MM3 (ref 140–450)
PMV BLD AUTO: 9.6 FL (ref 6–12)
POTASSIUM SERPL-SCNC: 4 MMOL/L (ref 3.5–5.2)
PROT SERPL-MCNC: 6.4 G/DL (ref 6–8.5)
PSA SERPL-MCNC: 1.71 NG/ML (ref 0–4)
RBC # BLD AUTO: 5 10*6/MM3 (ref 4.14–5.8)
SODIUM SERPL-SCNC: 140 MMOL/L (ref 136–145)
TRIGL SERPL-MCNC: 126 MG/DL (ref 0–150)
TSH SERPL DL<=0.05 MIU/L-ACNC: 1.95 UIU/ML (ref 0.27–4.2)
VIT B12 BLD-MCNC: >2000 PG/ML (ref 211–946)
VLDLC SERPL-MCNC: 22 MG/DL (ref 5–40)
WBC NRBC COR # BLD AUTO: 4.49 10*3/MM3 (ref 3.4–10.8)

## 2025-03-31 PROCEDURE — 80061 LIPID PANEL: CPT

## 2025-03-31 PROCEDURE — 80053 COMPREHEN METABOLIC PANEL: CPT

## 2025-03-31 PROCEDURE — 82607 VITAMIN B-12: CPT

## 2025-03-31 PROCEDURE — G0103 PSA SCREENING: HCPCS

## 2025-03-31 PROCEDURE — 84443 ASSAY THYROID STIM HORMONE: CPT

## 2025-03-31 PROCEDURE — 85027 COMPLETE CBC AUTOMATED: CPT

## 2025-03-31 RX ORDER — LISINOPRIL 10 MG/1
10 TABLET ORAL DAILY
Qty: 90 TABLET | Refills: 3 | Status: SHIPPED | OUTPATIENT
Start: 2025-03-31

## 2025-03-31 RX ORDER — MONTELUKAST SODIUM 10 MG/1
10 TABLET ORAL NIGHTLY
Qty: 90 TABLET | Refills: 3 | Status: SHIPPED | OUTPATIENT
Start: 2025-03-31

## 2025-03-31 RX ORDER — ALBUTEROL SULFATE 90 UG/1
1 INHALANT RESPIRATORY (INHALATION) EVERY 4 HOURS PRN
Qty: 54 G | Refills: 3 | Status: SHIPPED | OUTPATIENT
Start: 2025-03-31

## 2025-03-31 RX ORDER — FLUTICASONE PROPIONATE 50 MCG
SPRAY, SUSPENSION (ML) NASAL
Qty: 48 G | Refills: 3 | Status: SHIPPED | OUTPATIENT
Start: 2025-03-31

## 2025-03-31 RX ORDER — TERBINAFINE HYDROCHLORIDE 250 MG/1
250 TABLET ORAL DAILY
COMMUNITY

## 2025-03-31 RX ORDER — BUDESONIDE AND FORMOTEROL FUMARATE DIHYDRATE 80; 4.5 UG/1; UG/1
2 AEROSOL RESPIRATORY (INHALATION) 2 TIMES DAILY
Qty: 30.6 G | Refills: 3 | Status: SHIPPED | OUTPATIENT
Start: 2025-03-31

## 2025-03-31 RX ORDER — ATORVASTATIN CALCIUM 20 MG/1
20 TABLET, FILM COATED ORAL DAILY
Qty: 90 TABLET | Refills: 3 | Status: SHIPPED | OUTPATIENT
Start: 2025-03-31

## 2025-03-31 NOTE — PROGRESS NOTES
Subjective   The ABCs of the Annual Wellness Visit  Medicare Wellness Visit    From [place], [place]. Moved to [place] 1991. 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 Has worked with [email or web address] at [employer] from 1991 until retiring 2011. . Has son and daughter who live in [place].      Patient has a past medical history of hypertension, hyperlipidemia, prediabetes, mild persistent asthma, seasonal allergies, adenomatous colon polyp (11/2024 - rpt 5y) and basal cell carcinoma left ear (annual f/u derm Dr. Hernandez).    Gabino Gannon is a 69 y.o. patient who presents for a Medicare Wellness Visit.    The following portions of the patient's history were reviewed and   updated as appropriate: allergies, current medications, past family history, past medical history, past social history, past surgical history, and problem list.    Compared to one year ago, the patient's physical   health is the same.  Compared to one year ago, the patient's mental   health is the same.    Recent Hospitalizations:  He was not admitted to the hospital during the last year.     Current Medical Providers:  Patient Care Team:  Juan Carlos Paredes MD as PCP - General (Family Medicine)  René Hernandez (Dermatology)  Gabino Russell MD as Consulting Physician (Colon and Rectal Surgery)  Randy Triplett DPM as Consulting Physician (Podiatry)  Yolis Finn OD (Optometry)  Gabino Russell MD as Consulting Physician (Colon and Rectal Surgery)  Terrence Salazar MD as Consulting Physician (Orthopedic Surgery)    Outpatient Medications Prior to Visit   Medication Sig Dispense Refill    acetaminophen (TYLENOL) 500 MG tablet Take  by mouth As Needed for Mild Pain .      Apoaequorin (PREVAGEN PO) Take  by mouth Daily.      Boswellia-Glucosamine-Vit D (OSTEO BI-FLEX ONE PER DAY PO) Osteo Bi-Flex   Daily      Calcium Carbonate Antacid (ANTACID PO) Take  by mouth As Needed.      cetirizine (zyrTEC) 10 MG tablet Take 1  tablet by mouth Daily. Alternates with allegra      Cholecalciferol 25 MCG (1000 UT) capsule Vitamin D3 25 mcg (1,000 unit) capsule   Take 1 capsule every day by oral route.      diclofenac (VOLTAREN) 75 MG EC tablet Take 1 tablet by mouth 2 (Two) Times a Day. 30 tablet 0    famotidine (PEPCID) 20 MG tablet Take 1 tablet by mouth Daily.      fexofenadine (ALLEGRA) 180 MG tablet Take 1 tablet by mouth Daily. Alternates with Cetirizine      guaiFENesin (MUCINEX) 600 MG 12 hr tablet As Needed.      ibuprofen (ADVIL,MOTRIN) 200 MG tablet As Needed.      Krill Oil 300 MG capsule Daily.      multivitamin with minerals tablet tablet Take 1 tablet by mouth Daily.      Probiotic Product (PROBIOTIC DAILY PO) Daily.      pseudoephedrine (SUDAFED) 30 MG tablet Take 1 tablet by mouth Every 4 (Four) Hours As Needed for Congestion. Should be avoided because of blood pressure. 20 tablet 0    terbinafine (lamiSIL) 250 MG tablet Take 1 tablet by mouth Daily.      vitamin B-12 (CYANOCOBALAMIN) 500 MCG tablet Take 1 tablet by mouth Daily.      albuterol sulfate  (90 Base) MCG/ACT inhaler Inhale 1 puff Every 4 (Four) Hours As Needed for Wheezing. 54 g 3    atorvastatin (LIPITOR) 20 MG tablet Take 1 tablet by mouth Daily. Stop Zocor 90 tablet 3    budesonide-formoterol (SYMBICORT) 80-4.5 MCG/ACT inhaler Inhale 2 puffs 2 (Two) Times a Day. 30.6 g 3    fluticasone (FLONASE) 50 MCG/ACT nasal spray Spray 2 sprays every day by intranasal route as directed 48 g 3    lisinopril (PRINIVIL,ZESTRIL) 10 MG tablet Take 1 tablet by mouth Daily. 90 tablet 3    montelukast (SINGULAIR) 10 MG tablet Take 1 tablet by mouth Every Night. 90 tablet 3    tiZANidine (ZANAFLEX) 4 MG tablet Take 1 tablet by mouth Daily As Needed for Muscle Spasms. 90 tablet 3     No facility-administered medications prior to visit.     No opioid medication identified on active medication list. I have reviewed chart for other potential  high risk medication/s and harmful  "drug interactions in the elderly.      Aspirin is not on active medication list.  Aspirin use is not indicated based on review of current medical condition/s. Risk of harm outweighs potential benefits.  .    Patient Active Problem List   Diagnosis    Allergic rhinitis    Essential hypertension    Mixed hyperlipidemia    Mild persistent asthma without complication    BCC (basal cell carcinoma), ear, left    Overweight (BMI 25.0-29.9)    Prediabetes    Adenomatous polyp of ascending colon    Sprain of medial collateral ligament of right knee     Advance Care Planning Advance Directive is not on file.  ACP discussion was held with the patient during this visit. Patient has an advance directive (not in EMR), copy requested.            Objective   Vitals:    25 0806   BP: 126/80   BP Location: Left arm   Patient Position: Sitting   Cuff Size: Adult   Pulse: 72   Temp: 98.4 °F (36.9 °C)   TempSrc: Temporal   Weight: 88.5 kg (195 lb 3.2 oz)   Height: 179 cm (70.47\")   PainSc: 3    PainLoc: Knee       Estimated body mass index is 27.63 kg/m² as calculated from the following:    Height as of this encounter: 179 cm (70.47\").    Weight as of this encounter: 88.5 kg (195 lb 3.2 oz).    BMI is >= 25 and <30. (Overweight) The following options were offered after discussion;: exercise counseling/recommendations and nutrition counseling/recommendations           Does the patient have evidence of cognitive impairment? No  Lab Results   Component Value Date    HGBA1C 5.5 2025                                                                                                Health  Risk Assessment    Smoking Status:  Social History     Tobacco Use   Smoking Status Former    Current packs/day: 0.00    Average packs/day: 2.0 packs/day for 15.0 years (30.0 ttl pk-yrs)    Types: Cigarettes    Start date: 1969    Quit date: 1984    Years since quittin.2   Smokeless Tobacco Never     Alcohol Consumption:  Social History "     Substance and Sexual Activity   Alcohol Use Yes    Comment: 10 weekly       Fall Risk Screen  RORYADI Fall Risk Assessment was completed, and patient is at LOW risk for falls.Assessment completed on:3/31/2025    Depression Screening   Little interest or pleasure in doing things? Not at all   Feeling down, depressed, or hopeless? Not at all   PHQ-2 Total Score 0      Health Habits and Functional and Cognitive Screening:      3/24/2025     8:31 AM   Functional & Cognitive Status   Do you have difficulty preparing food and eating? No   Do you have difficulty bathing yourself, getting dressed or grooming yourself? No   Do you have difficulty using the toilet? No   Do you have difficulty moving around from place to place? No   Do you have trouble with steps or getting out of a bed or a chair? No   Current Diet Well Balanced Diet   Dental Exam Up to date   Eye Exam Up to date   Exercise (times per week) 4 times per week   Current Exercises Include Aerobics;Light Weights;Treadmill;Yard Work   Do you need help using the phone?  No   Are you deaf or do you have serious difficulty hearing?  No   Do you need help to go to places out of walking distance? No   Do you need help shopping? No   Do you need help preparing meals?  No   Do you need help with housework?  No   Do you need help with laundry? No   Do you need help taking your medications? No   Do you need help managing money? No   Do you ever drive or ride in a car without wearing a seat belt? No   Have you felt unusual stress, anger or loneliness in the last month? No   Who do you live with? Spouse   If you need help, do you have trouble finding someone available to you? No   Have you been bothered in the last four weeks by sexual problems? No   Do you have difficulty concentrating, remembering or making decisions? No           Age-appropriate Screening Schedule:  Refer to the list below for future screening recommendations based on patient's age, sex and/or medical  conditions. Orders for these recommended tests are listed in the plan section. The patient has been provided with a written plan.    Health Maintenance List  Health Maintenance   Topic Date Due    COVID-19 Vaccine (10 - 2024-25 season) 03/20/2025    LIPID PANEL  03/27/2025    ANNUAL WELLNESS VISIT  03/31/2026    COLORECTAL CANCER SCREENING  11/04/2029    TDAP/TD VACCINES (2 - Td or Tdap) 02/09/2032    HEPATITIS C SCREENING  Completed    INFLUENZA VACCINE  Completed    Pneumococcal Vaccine 50+  Completed    AAA SCREEN ONCE  Completed    ZOSTER VACCINE  Completed                                                                                                                                                CMS Preventative Services Quick Reference  Risk Factors Identified During Encounter  Immunizations Discussed/Encouraged: COVID19    The above risks/problems have been discussed with the patient.  Pertinent information has been shared with the patient in the After Visit Summary.  An After Visit Summary and PPPS were made available to the patient.    Follow Up:   Next Medicare Wellness visit to be scheduled in 1 year.         Additional E&M Note during same encounter follows:  Patient has additional, significant, and separately identifiable condition(s)/problem(s) that require work above and beyond the Medicare Wellness Visit     Chief Complaint  Medicare Wellness-subsequent and Knee Pain    Subjective   HPI  Gabino is also being seen today for additional medical problem/s.        Patient is here for annual follow-up of his health.    He tells me he fell in August, he was by the pool, slipped on the edge, but over towards his left side, his right ankle got caught and he sustained a valgus trauma of the right knee, followed by significant pain on the medial side.  He went to urgent care next day, x-rays were normal.  He saw Ortho Dr. Salazar, and they did MRI indicating MCL injury and possible meniscal tear of the medial  "meniscus dorsal lateral side.  Also noted with some degenerative changes of the patellofemoral compartment.  Did not do physical therapy, but he got instructions to do so therapy, squats.  Overall symptoms have improved.  He is able to do the treadmill with a slight 3% grade, but no higher grade.  Walking stairs, twisting does cause pain.  More recently his pain has moved a bit forward towards the front of the knee.  Initially he did have some swelling, not currently.    Otherwise he is doing fairly well.  His asthma typically flares up with the pollen season, but on  he does not use albuterol more than once or twice weekly, may go longer periods without needing albuterol.  He continues to take Symbicort.    Objective   Vital Signs:  /80 (BP Location: Left arm, Patient Position: Sitting, Cuff Size: Adult)   Pulse 72   Temp 98.4 °F (36.9 °C) (Temporal)   Ht 179 cm (70.47\")   Wt 88.5 kg (195 lb 3.2 oz)   BMI 27.63 kg/m²   Physical Exam  Vitals reviewed.   Constitutional:       Appearance: Normal appearance.   HENT:      Head: Normocephalic and atraumatic.      Right Ear: Tympanic membrane, ear canal and external ear normal. There is no impacted cerumen.      Left Ear: Tympanic membrane, ear canal and external ear normal. There is no impacted cerumen.      Ears:      Comments: Hearing aids removed for inspection.     Nose: Nose normal. No congestion.      Mouth/Throat:      Mouth: Mucous membranes are moist.      Pharynx: Oropharynx is clear.   Eyes:      Extraocular Movements: Extraocular movements intact.      Conjunctiva/sclera: Conjunctivae normal.   Cardiovascular:      Rate and Rhythm: Normal rate and regular rhythm.      Heart sounds: Normal heart sounds. No murmur heard.  Pulmonary:      Effort: Pulmonary effort is normal.      Breath sounds: Normal breath sounds.   Abdominal:      General: There is no distension.      Palpations: Abdomen is soft. There is no mass.      Tenderness: There is no " abdominal tenderness.   Musculoskeletal:         General: Tenderness present. No swelling.      Cervical back: Neck supple. No tenderness.      Right lower leg: No edema.      Left lower leg: No edema.      Comments: Left knee: Tender medial collateral ligaments.  Also pain on the medial side with valgus stress.  Some tenderness with pressure against the patella.   Lymphadenopathy:      Cervical: No cervical adenopathy.   Skin:     General: Skin is warm and dry.   Neurological:      Mental Status: He is alert and oriented to person, place, and time. Mental status is at baseline.   Psychiatric:         Behavior: Behavior normal.         Thought Content: Thought content normal.                    Assessment and Plan      Encounter for subsequent annual wellness visit (AWV) in Medicare patient         Sprain of medial collateral ligament of right knee, subsequent encounter         Essential hypertension      Orders:    lisinopril (PRINIVIL,ZESTRIL) 10 MG tablet; Take 1 tablet by mouth Daily.    TSH Rfx On Abnormal To Free T4; Future    CBC (No Diff); Future    Comprehensive Metabolic Panel; Future    Mixed hyperlipidemia       Orders:    atorvastatin (LIPITOR) 20 MG tablet; Take 1 tablet by mouth Daily. Stop Zocor    Lipid Panel; Future    Prediabetes    Orders:    POC Glycosylated Hemoglobin (Hb A1C)    Allergic rhinitis, unspecified seasonality, unspecified trigger    Orders:    fluticasone (FLONASE) 50 MCG/ACT nasal spray; Spray 2 sprays every day by intranasal route as directed    Mild persistent asthma without complication            Orders:    albuterol sulfate  (90 Base) MCG/ACT inhaler; Inhale 1 puff Every 4 (Four) Hours As Needed for Wheezing.    budesonide-formoterol (SYMBICORT) 80-4.5 MCG/ACT inhaler; Inhale 2 puffs 2 (Two) Times a Day.    Adenomatous polyp of ascending colon         Screening PSA (prostate specific antigen)    Orders:    PSA Screen; Future    Encounter for vitamin deficiency  screening    Orders:    Vitamin B12; Future    Overweight (BMI 25.0-29.9)  Patient's (Body mass index is 27.63 kg/m².) indicates that they are overweight with health conditions that include  . Weight is . BMI . We discussed .           1. Encounter for subsequent annual wellness visit (AWV) in Medicare patient    2. Sprain of medial collateral ligament of right knee, subsequent encounter  Continue self exercise and activity as tolerated.  Consider physical therapy.  Patient is somewhat reluctant to proceed with any surgery at the moment.  He wants to hold off with physical therapy for now, he might consider in the fall.    3. Essential hypertension  BP Readings from Last 3 Encounters:   03/31/25 126/80   11/14/24 130/78   10/01/24 138/82   Blood pressure fairly well-controlled.  Cautioned against frequent use of over-the-counter Sudafed which can increase blood pressure  - lisinopril (PRINIVIL,ZESTRIL) 10 MG tablet; Take 1 tablet by mouth Daily.  Dispense: 90 tablet; Refill: 3  - TSH Rfx On Abnormal To Free T4; Future  - CBC (No Diff); Future  - Comprehensive Metabolic Panel; Future    4. Mixed hyperlipidemia  Stable.  Continue on atorvastatin.  Recheck lipids fasting today.  - atorvastatin (LIPITOR) 20 MG tablet; Take 1 tablet by mouth Daily. Stop Zocor  Dispense: 90 tablet; Refill: 3  - Lipid Panel; Future    5. Prediabetes  Hemoglobin A1C   Date Value Ref Range Status   03/31/2025 5.5 4.5 - 5.7 % Final   03/27/2024 5.50 4.80 - 5.60 % Final   03/27/2023 5.50 4.80 - 5.60 % Final   03/16/2022 5.70 (H) 4.80 - 5.60 % Final   Stable in the normal range.  Will continue to monitor with lifestyle only for now.  - POC Glycosylated Hemoglobin (Hb A1C)    6. Allergic rhinitis, unspecified seasonality, unspecified trigger  Stable.  Continue on Flonase.  - fluticasone (FLONASE) 50 MCG/ACT nasal spray; Spray 2 sprays every day by intranasal route as directed  Dispense: 48 g; Refill: 3    7. Mild persistent asthma without  complication  Stable and well-controlled.  Continue on Symbicort RN as needed use of albuterol.  - albuterol sulfate  (90 Base) MCG/ACT inhaler; Inhale 1 puff Every 4 (Four) Hours As Needed for Wheezing.  Dispense: 54 g; Refill: 3  - budesonide-formoterol (SYMBICORT) 80-4.5 MCG/ACT inhaler; Inhale 2 puffs 2 (Two) Times a Day.  Dispense: 30.6 g; Refill: 3    8. Adenomatous polyp of ascending colon  Repeat colonoscopy 7 years, so 2031.    9. Screening PSA (prostate specific antigen)  Patient wants to continue prostate cancer screening PSA  - PSA Screen; Future    10. Encounter for vitamin deficiency screening  - Vitamin B12; Future    11. Overweight (BMI 25.0-29.9)  BMI is >= 25 and <30. (Overweight) The following options were offered after discussion;: exercise counseling/recommendations and nutrition counseling/recommendations         Follow Up   Return in about 1 year (around 3/31/2026), or if symptoms worsen or fail to improve, for Medicare Wellness.    Future Appointments         Provider Department Center    3/31/2025 9:00 AM LAB [facility]    4/1/2026 8:00 AM Juan Carlos Paredes MD [facility]              Patient was given instructions and counseling regarding his condition or for health maintenance advice. Please see specific information pulled into the AVS if appropriate.    Juan Carlos Paredes MD

## 2025-03-31 NOTE — ASSESSMENT & PLAN NOTE
Orders:    atorvastatin (LIPITOR) 20 MG tablet; Take 1 tablet by mouth Daily. Stop Zocor    Lipid Panel; Future

## 2025-03-31 NOTE — ASSESSMENT & PLAN NOTE
Orders:    lisinopril (PRINIVIL,ZESTRIL) 10 MG tablet; Take 1 tablet by mouth Daily.    TSH Rfx On Abnormal To Free T4; Future    CBC (No Diff); Future    Comprehensive Metabolic Panel; Future

## 2025-03-31 NOTE — ASSESSMENT & PLAN NOTE
Orders:    fluticasone (FLONASE) 50 MCG/ACT nasal spray; Spray 2 sprays every day by intranasal route as directed

## 2025-03-31 NOTE — ASSESSMENT & PLAN NOTE
Patient's (Body mass index is 27.63 kg/m².) indicates that they are overweight with health conditions that include  . Weight is . BMI . We discussed .

## 2025-03-31 NOTE — ASSESSMENT & PLAN NOTE
Orders:    albuterol sulfate  (90 Base) MCG/ACT inhaler; Inhale 1 puff Every 4 (Four) Hours As Needed for Wheezing.    budesonide-formoterol (SYMBICORT) 80-4.5 MCG/ACT inhaler; Inhale 2 puffs 2 (Two) Times a Day.

## 2025-04-25 ENCOUNTER — LAB (OUTPATIENT)
Dept: LAB | Facility: HOSPITAL | Age: 70
End: 2025-04-25
Payer: MEDICARE

## 2025-04-25 DIAGNOSIS — B35.1 DERMATOPHYTOSIS OF NAIL: Primary | ICD-10-CM

## 2025-04-25 LAB
ALBUMIN SERPL-MCNC: 3.9 G/DL (ref 3.5–5.2)
ALBUMIN/GLOB SERPL: 1.6 G/DL
ALP SERPL-CCNC: 47 U/L (ref 39–117)
ALT SERPL W P-5'-P-CCNC: 25 U/L (ref 1–41)
ANION GAP SERPL CALCULATED.3IONS-SCNC: 9.9 MMOL/L (ref 5–15)
AST SERPL-CCNC: 32 U/L (ref 1–40)
BILIRUB SERPL-MCNC: 0.6 MG/DL (ref 0–1.2)
BUN SERPL-MCNC: 15 MG/DL (ref 8–23)
BUN/CREAT SERPL: 13.5 (ref 7–25)
CALCIUM SPEC-SCNC: 9.7 MG/DL (ref 8.6–10.5)
CHLORIDE SERPL-SCNC: 105 MMOL/L (ref 98–107)
CO2 SERPL-SCNC: 25.1 MMOL/L (ref 22–29)
CREAT SERPL-MCNC: 1.11 MG/DL (ref 0.76–1.27)
EGFRCR SERPLBLD CKD-EPI 2021: 72.3 ML/MIN/1.73
GLOBULIN UR ELPH-MCNC: 2.4 GM/DL
GLUCOSE SERPL-MCNC: 90 MG/DL (ref 65–99)
POTASSIUM SERPL-SCNC: 4.6 MMOL/L (ref 3.5–5.2)
PROT SERPL-MCNC: 6.3 G/DL (ref 6–8.5)
SODIUM SERPL-SCNC: 140 MMOL/L (ref 136–145)

## 2025-04-25 PROCEDURE — 36415 COLL VENOUS BLD VENIPUNCTURE: CPT

## 2025-04-25 PROCEDURE — 80053 COMPREHEN METABOLIC PANEL: CPT

## 2025-05-28 ENCOUNTER — TRANSCRIBE ORDERS (OUTPATIENT)
Dept: LAB | Facility: HOSPITAL | Age: 70
End: 2025-05-28
Payer: MEDICARE

## 2025-05-28 ENCOUNTER — LAB (OUTPATIENT)
Dept: LAB | Facility: HOSPITAL | Age: 70
End: 2025-05-28
Payer: MEDICARE

## 2025-05-28 DIAGNOSIS — B35.1 DERMATOPHYTOSIS OF NAIL: Primary | ICD-10-CM

## 2025-05-28 LAB
ALBUMIN SERPL-MCNC: 4.2 G/DL (ref 3.5–5.2)
ALBUMIN/GLOB SERPL: 2 G/DL
ALP SERPL-CCNC: 56 U/L (ref 39–117)
ALT SERPL W P-5'-P-CCNC: 20 U/L (ref 1–41)
ANION GAP SERPL CALCULATED.3IONS-SCNC: 9.9 MMOL/L (ref 5–15)
AST SERPL-CCNC: 26 U/L (ref 1–40)
BILIRUB SERPL-MCNC: 0.5 MG/DL (ref 0–1.2)
BUN SERPL-MCNC: 11 MG/DL (ref 8–23)
BUN/CREAT SERPL: 10.6 (ref 7–25)
CALCIUM SPEC-SCNC: 9 MG/DL (ref 8.6–10.5)
CHLORIDE SERPL-SCNC: 104 MMOL/L (ref 98–107)
CO2 SERPL-SCNC: 25.1 MMOL/L (ref 22–29)
CREAT SERPL-MCNC: 1.04 MG/DL (ref 0.76–1.27)
EGFRCR SERPLBLD CKD-EPI 2021: 78.2 ML/MIN/1.73
GLOBULIN UR ELPH-MCNC: 2.1 GM/DL
GLUCOSE SERPL-MCNC: 83 MG/DL (ref 65–99)
POTASSIUM SERPL-SCNC: 4.4 MMOL/L (ref 3.5–5.2)
PROT SERPL-MCNC: 6.3 G/DL (ref 6–8.5)
SODIUM SERPL-SCNC: 139 MMOL/L (ref 136–145)

## 2025-05-28 PROCEDURE — 80053 COMPREHEN METABOLIC PANEL: CPT | Performed by: PODIATRIST

## 2025-05-28 PROCEDURE — 36415 COLL VENOUS BLD VENIPUNCTURE: CPT | Performed by: PODIATRIST

## 2025-06-27 ENCOUNTER — LAB (OUTPATIENT)
Dept: LAB | Facility: HOSPITAL | Age: 70
End: 2025-06-27
Payer: MEDICARE

## 2025-06-27 ENCOUNTER — TRANSCRIBE ORDERS (OUTPATIENT)
Dept: LAB | Facility: HOSPITAL | Age: 70
End: 2025-06-27
Payer: MEDICARE

## 2025-06-27 DIAGNOSIS — B35.1 DERMATOPHYTOSIS OF NAIL: ICD-10-CM

## 2025-06-27 DIAGNOSIS — B35.1 DERMATOPHYTOSIS OF NAIL: Primary | ICD-10-CM

## 2025-06-27 LAB
ALBUMIN SERPL-MCNC: 4 G/DL (ref 3.5–5.2)
ALBUMIN/GLOB SERPL: 2 G/DL
ALP SERPL-CCNC: 48 U/L (ref 39–117)
ALT SERPL W P-5'-P-CCNC: 21 U/L (ref 1–41)
ANION GAP SERPL CALCULATED.3IONS-SCNC: 11.6 MMOL/L (ref 5–15)
AST SERPL-CCNC: 28 U/L (ref 1–40)
BILIRUB SERPL-MCNC: 0.7 MG/DL (ref 0–1.2)
BUN SERPL-MCNC: 19 MG/DL (ref 8–23)
BUN/CREAT SERPL: 14.4 (ref 7–25)
CALCIUM SPEC-SCNC: 9.6 MG/DL (ref 8.6–10.5)
CHLORIDE SERPL-SCNC: 108 MMOL/L (ref 98–107)
CO2 SERPL-SCNC: 22.4 MMOL/L (ref 22–29)
CREAT SERPL-MCNC: 1.32 MG/DL (ref 0.76–1.27)
EGFRCR SERPLBLD CKD-EPI 2021: 58.8 ML/MIN/1.73
GLOBULIN UR ELPH-MCNC: 2 GM/DL
GLUCOSE SERPL-MCNC: 112 MG/DL (ref 65–99)
POTASSIUM SERPL-SCNC: 4.4 MMOL/L (ref 3.5–5.2)
PROT SERPL-MCNC: 6 G/DL (ref 6–8.5)
SODIUM SERPL-SCNC: 142 MMOL/L (ref 136–145)

## 2025-06-27 PROCEDURE — 80053 COMPREHEN METABOLIC PANEL: CPT

## 2025-06-27 PROCEDURE — 36415 COLL VENOUS BLD VENIPUNCTURE: CPT

## 2025-08-06 ENCOUNTER — LAB (OUTPATIENT)
Dept: LAB | Facility: HOSPITAL | Age: 70
End: 2025-08-06
Payer: MEDICARE

## 2025-08-06 ENCOUNTER — TRANSCRIBE ORDERS (OUTPATIENT)
Dept: LAB | Facility: HOSPITAL | Age: 70
End: 2025-08-06
Payer: MEDICARE

## 2025-08-06 DIAGNOSIS — B35.1 DERMATOPHYTOSIS OF NAIL: Primary | ICD-10-CM

## 2025-08-06 LAB
ALBUMIN SERPL-MCNC: 4 G/DL (ref 3.5–5.2)
ALBUMIN/GLOB SERPL: 1.7 G/DL
ALP SERPL-CCNC: 57 U/L (ref 39–117)
ALT SERPL W P-5'-P-CCNC: 16 U/L (ref 1–41)
ANION GAP SERPL CALCULATED.3IONS-SCNC: 14.4 MMOL/L (ref 5–15)
AST SERPL-CCNC: 28 U/L (ref 1–40)
BILIRUB SERPL-MCNC: 0.5 MG/DL (ref 0–1.2)
BUN SERPL-MCNC: 13 MG/DL (ref 8–23)
BUN/CREAT SERPL: 13.7 (ref 7–25)
CALCIUM SPEC-SCNC: 8.9 MG/DL (ref 8.6–10.5)
CHLORIDE SERPL-SCNC: 106 MMOL/L (ref 98–107)
CO2 SERPL-SCNC: 21.6 MMOL/L (ref 22–29)
CREAT SERPL-MCNC: 0.95 MG/DL (ref 0.76–1.27)
EGFRCR SERPLBLD CKD-EPI 2021: 86.6 ML/MIN/1.73
GLOBULIN UR ELPH-MCNC: 2.4 GM/DL
GLUCOSE SERPL-MCNC: 101 MG/DL (ref 65–99)
POTASSIUM SERPL-SCNC: 4.2 MMOL/L (ref 3.5–5.2)
PROT SERPL-MCNC: 6.4 G/DL (ref 6–8.5)
SODIUM SERPL-SCNC: 142 MMOL/L (ref 136–145)

## 2025-08-06 PROCEDURE — 80053 COMPREHEN METABOLIC PANEL: CPT | Performed by: PODIATRIST

## 2025-08-06 PROCEDURE — 36415 COLL VENOUS BLD VENIPUNCTURE: CPT | Performed by: PODIATRIST
